# Patient Record
Sex: FEMALE | Race: WHITE | NOT HISPANIC OR LATINO | Employment: FULL TIME
[De-identification: names, ages, dates, MRNs, and addresses within clinical notes are randomized per-mention and may not be internally consistent; named-entity substitution may affect disease eponyms.]

---

## 2023-05-21 ENCOUNTER — HEALTH MAINTENANCE LETTER (OUTPATIENT)
Age: 38
End: 2023-05-21

## 2023-05-30 ENCOUNTER — OFFICE VISIT (OUTPATIENT)
Dept: FAMILY MEDICINE | Facility: CLINIC | Age: 38
End: 2023-05-30
Payer: COMMERCIAL

## 2023-05-30 VITALS
WEIGHT: 270 LBS | TEMPERATURE: 98.3 F | HEART RATE: 85 BPM | BODY MASS INDEX: 43.39 KG/M2 | SYSTOLIC BLOOD PRESSURE: 116 MMHG | OXYGEN SATURATION: 97 % | RESPIRATION RATE: 18 BRPM | HEIGHT: 66 IN | DIASTOLIC BLOOD PRESSURE: 76 MMHG

## 2023-05-30 DIAGNOSIS — Z00.00 ROUTINE GENERAL MEDICAL EXAMINATION AT A HEALTH CARE FACILITY: Primary | ICD-10-CM

## 2023-05-30 DIAGNOSIS — Z11.4 SCREENING FOR HIV (HUMAN IMMUNODEFICIENCY VIRUS): ICD-10-CM

## 2023-05-30 DIAGNOSIS — Z11.59 NEED FOR HEPATITIS C SCREENING TEST: ICD-10-CM

## 2023-05-30 DIAGNOSIS — F32.A DEPRESSION, UNSPECIFIED DEPRESSION TYPE: ICD-10-CM

## 2023-05-30 DIAGNOSIS — R73.09 ELEVATED GLUCOSE: ICD-10-CM

## 2023-05-30 DIAGNOSIS — L70.9 ACNE, UNSPECIFIED ACNE TYPE: ICD-10-CM

## 2023-05-30 LAB — HBA1C MFR BLD: 5.4 % (ref 0–5.6)

## 2023-05-30 PROCEDURE — 80048 BASIC METABOLIC PNL TOTAL CA: CPT

## 2023-05-30 PROCEDURE — 87389 HIV-1 AG W/HIV-1&-2 AB AG IA: CPT

## 2023-05-30 PROCEDURE — 36415 COLL VENOUS BLD VENIPUNCTURE: CPT

## 2023-05-30 PROCEDURE — 99214 OFFICE O/P EST MOD 30 MIN: CPT | Mod: 25

## 2023-05-30 PROCEDURE — 86803 HEPATITIS C AB TEST: CPT

## 2023-05-30 PROCEDURE — 83036 HEMOGLOBIN GLYCOSYLATED A1C: CPT

## 2023-05-30 PROCEDURE — 99385 PREV VISIT NEW AGE 18-39: CPT

## 2023-05-30 RX ORDER — SPIRONOLACTONE 50 MG/1
1 TABLET, FILM COATED ORAL
COMMUNITY
Start: 2023-03-14 | End: 2023-05-30

## 2023-05-30 RX ORDER — ESCITALOPRAM OXALATE 20 MG/1
20 TABLET ORAL
Qty: 90 TABLET | Refills: 3 | Status: SHIPPED | OUTPATIENT
Start: 2023-05-30 | End: 2023-12-31

## 2023-05-30 RX ORDER — SPIRONOLACTONE 50 MG/1
50 TABLET, FILM COATED ORAL
Qty: 270 TABLET | Refills: 3 | Status: SHIPPED | OUTPATIENT
Start: 2023-05-30 | End: 2023-12-31

## 2023-05-30 RX ORDER — ALBUTEROL SULFATE 0.83 MG/ML
2.5 SOLUTION RESPIRATORY (INHALATION) EVERY 6 HOURS PRN
COMMUNITY

## 2023-05-30 RX ORDER — ETONOGESTREL AND ETHINYL ESTRADIOL .12; .015 MG/D; MG/D
RING VAGINAL
COMMUNITY
Start: 2023-05-26 | End: 2024-02-06

## 2023-05-30 RX ORDER — ESCITALOPRAM OXALATE 20 MG/1
1 TABLET ORAL
COMMUNITY
Start: 2023-02-22 | End: 2023-05-30

## 2023-05-30 NOTE — PROGRESS NOTES
bmp   SUBJECTIVE:   CC: [place] is an 37 year old who presents for preventive health visit.       5/30/2023     1:52 PM   Additional Questions   Roomed by Brigitte Cooper CMA   Patient has been advised of split billing requirements and indicates understanding: Yes     History of Present Illness       Reason for visit:  Physical, med refill    She eats 0-1 servings of fruits and vegetables daily.She consumes 0 sweetened beverage(s) daily.She exercises with enough effort to increase her heart rate 20 to 29 minutes per day.  She exercises with enough effort to increase her heart rate 3 or less days per week.   She is taking medications regularly.    Recently moved back from [place] 6 months ago to be closer to family. No acute concerns today. Last pap smear was ~ 3 years ago and normal.    Today's PHQ-2 Score:       5/29/2023     9:31 PM   PHQ-2 ( 1999 [employer])   Q1: Little interest or pleasure in doing things 0   Q2: Feeling down, depressed or hopeless 0   PHQ-2 Score 0   Q1: Little interest or pleasure in doing things Not at all   Q2: Feeling down, depressed or hopeless Not at all   PHQ-2 Score 0     Have you ever done Advance Care Planning? (For example, a Health Directive, POLST, or a discussion with a medical provider or your loved ones about your wishes): No, advance care planning information given to patient to review.  Patient declined advance care planning discussion at this time.    Social History     Tobacco Use     Smoking status: Never     Smokeless tobacco: Never   Vaping Use     Vaping status: Never Used   Substance Use Topics     Alcohol use: Yes            View : No data to display.              Reviewed orders with patient.  Reviewed health maintenance and updated orders accordingly - Yes  Labs reviewed in EPIC  BP Readings from Last 3 Encounters:   05/30/23 116/76    Wt Readings from Last 3 Encounters:   05/30/23 122.5 kg (270 lb)         There is no problem list on file for this patient.    No past  surgical history on file.    Social History     Tobacco Use     Smoking status: Never     Smokeless tobacco: Never   Vaping Use     Vaping status: Never Used   Substance Use Topics     Alcohol use: Yes     Family History   Problem Relation Age of Onset     Other Cancer Mother         Hodgkin's lymphoma     Diabetes Father          No current outpatient medications on file.     No Known Allergies    Breast Cancer Screening:    FHS-7:        View : No data to display.              Patient under 40 years of age: Routine Mammogram Screening not recommended.   Pertinent mammograms are reviewed under the imaging tab.    History of abnormal Pap smear: NO - age 30-65 PAP every 5 years with negative HPV co-testing recommended       Reviewed and updated as needed this visit by clinical staff   Tobacco  Allergies  Meds  Problems  Med Hx  Surg Hx  Fam Hx          Reviewed and updated as needed this visit by Provider   Tobacco  Allergies  Meds  Problems  Med Hx  Surg Hx  Fam Hx           Past Medical History:   Diagnosis Date     Depressive disorder 2012     Uncomplicated asthma 2013      Past Surgical History:   Procedure Laterality Date     TONSILLECTOMY  1989       Review of Systems  CONSTITUTIONAL: NEGATIVE for fever, chills, change in weight  INTEGUMENTARU/SKIN: NEGATIVE for worrisome rashes, moles or lesions  EYES: NEGATIVE for vision changes or irritation  ENT: NEGATIVE for ear, mouth and throat problems  RESP: NEGATIVE for significant cough or SOB  BREAST: NEGATIVE for masses, tenderness or discharge  CV: NEGATIVE for chest pain, palpitations or peripheral edema  GI: NEGATIVE for nausea, abdominal pain, heartburn, or change in bowel habits  : NEGATIVE for unusual urinary or vaginal symptoms. Periods are regular.  MUSCULOSKELETAL: NEGATIVE for significant arthralgias or myalgia  NEURO: NEGATIVE for weakness, dizziness or paresthesias  PSYCHIATRIC: NEGATIVE for changes in mood or affect     OBJECTIVE:   BP  "116/76 (BP Location: Right arm, Patient Position: Sitting, Cuff Size: Adult Large)   Pulse 85   Temp 98.3  F (36.8  C) (Oral)   Resp 18   Ht 1.676 m (5' 6\")   Wt 122.5 kg (270 lb)   LMP 04/25/2023 (Approximate)   SpO2 97%   BMI 43.58 kg/m    Physical Exam  GENERAL: healthy, alert and no distress  EYES: Eyes grossly normal to inspection, PERRL and conjunctivae and sclerae normal  HENT: ear canals and TM's normal, nose and mouth without ulcers or lesions  NECK: no adenopathy, no asymmetry, masses, or scars and thyroid normal to palpation  RESP: lungs clear to auscultation - no rales, rhonchi or wheezes  CV: regular rate and rhythm, normal S1 S2, no S3 or S4, no murmur, click or rub  ABDOMEN: soft, nontender, no masses and bowel sounds normal  MS: no gross musculoskeletal defects noted, no edema  SKIN: no suspicious lesions or rashes  NEURO: Normal strength and tone, mentation intact and speech normal  PSYCH: mentation appears normal, affect normal/bright    Diagnostic Test Results:  none     ASSESSMENT/PLAN:   (Z00.00) Routine general medical examination at a health care facility  (primary encounter diagnosis)  Stable exam. Routine screening labs. Follow up in 1 year for annual wellness; sooner as needed for acute concerns.   Plan: REVIEW OF HEALTH MAINTENANCE PROTOCOL ORDERS,         Hemoglobin A1c, PRIMARY CARE FOLLOW-UP         SCHEDULING    (F32.A) Depression, unspecified depression type  Stable. No acute concerns or new side effects of medication. Refilled for 1 year.   Plan: escitalopram (LEXAPRO) 20 MG tablet          (R73.09) Elevated glucose  History of elevated glucose in the past. Told she has pre-diabetes. Will check A1C today.  Plan: Hemoglobin A1c          (L70.9) Acne, unspecified acne type  Stable on spironolactone. No acute concerns or new side effects of the medication. Will check BMP. Refilled for 1 year.  Plan: spironolactone (ALDACTONE) 50 MG tablet  BMP          (Z11.4) Screening for HIV " "(human immunodeficiency virus)  Plan: HIV Antigen Antibody Combo          (Z11.59) Need for hepatitis C screening test  Plan: Hepatitis C Screen Reflex to HCV RNA Quant and         Genotype         Patient has been advised of split billing requirements and indicates understanding: Yes    COUNSELING:  Reviewed preventive health counseling, as reflected in patient instructions    BMI:   Estimated body mass index is 43.58 kg/m  as calculated from the following:    Height as of this encounter: 1.676 m (5' 6\").    Weight as of this encounter: 122.5 kg (270 lb).   Weight management plan: Discussed healthy diet and exercise guidelines    She reports that she has never smoked. She has never used smokeless tobacco.        Saundra Mayo PA-C  [facility]  "

## 2023-05-30 NOTE — PROGRESS NOTES
"  {PROVIDER CHARTING PREFERENCE:932100}    Pooja Grove is a 37 year old, presenting for the following health issues:  Recheck Medication and Establish Care        5/30/2023     1:52 PM   Additional Questions   Roomed by Brigitte Cooper CMA     History of Present Illness       Reason for visit:  Physical, med refill    She eats 0-1 servings of fruits and vegetables daily.She consumes 0 sweetened beverage(s) daily.She exercises with enough effort to increase her heart rate 20 to 29 minutes per day.  She exercises with enough effort to increase her heart rate 3 or less days per week.   She is taking medications regularly.       {SUPERLIST (Optional):592166}  {additonal problems for provider to add (Optional):336120}      Review of Systems   {ROS COMP (Optional):106248}      Objective    /76 (BP Location: Right arm, Patient Position: Sitting, Cuff Size: Adult Large)   Pulse 85   Temp 98.3  F (36.8  C) (Oral)   Resp 18   Ht 1.676 m (5' 6\")   Wt 122.5 kg (270 lb)   LMP 04/25/2023 (Approximate)   SpO2 97%   BMI 43.58 kg/m    Body mass index is 43.58 kg/m .  Physical Exam   {Exam List (Optional):719207}    {Diagnostic Test Results (Optional):740640}    {AMBULATORY ATTESTATION (Optional):019955}            "

## 2023-05-31 LAB
ANION GAP SERPL CALCULATED.3IONS-SCNC: 14 MMOL/L (ref 7–15)
BUN SERPL-MCNC: 13.1 MG/DL (ref 6–20)
CALCIUM SERPL-MCNC: 9 MG/DL (ref 8.6–10)
CHLORIDE SERPL-SCNC: 101 MMOL/L (ref 98–107)
CREAT SERPL-MCNC: 0.94 MG/DL (ref 0.51–0.95)
DEPRECATED HCO3 PLAS-SCNC: 22 MMOL/L (ref 22–29)
GFR SERPL CREATININE-BSD FRML MDRD: 79 ML/MIN/1.73M2
GLUCOSE SERPL-MCNC: 121 MG/DL (ref 70–99)
HCV AB SERPL QL IA: NONREACTIVE
HIV 1+2 AB+HIV1 P24 AG SERPL QL IA: NONREACTIVE
POTASSIUM SERPL-SCNC: 4.6 MMOL/L (ref 3.4–5.3)
SODIUM SERPL-SCNC: 137 MMOL/L (ref 136–145)

## 2023-06-05 ENCOUNTER — TELEPHONE (OUTPATIENT)
Dept: FAMILY MEDICINE | Facility: CLINIC | Age: 38
End: 2023-06-05

## 2023-06-05 NOTE — LETTER
"June 7, 2023      M[place] Choe  [address] [address] [address]        Dear Ms.Дмитрий,    We have been unable to reach you by phone. We are writing to inform you of your test results.    Per Saundra Mayo PA-C:  \"Please call the patient and let her know that her glucose level was elevated but give she was not fasting I am not concerned. Additionally her A1C was 5.4% which is normal. Her kidney function and electrolytes were also normal.\"    If you have any questions or concerns, please call the clinic at the number listed above.       Sincerely,      Gayle Ramirez RN               "

## 2023-06-05 NOTE — TELEPHONE ENCOUNTER
Message #1 left for patient to return call to triage nurse      Saundra Mayo PA-C   6/5/2023 11:49 AM CDT       Please call the patient and let her know that her glucose level was elevated but give she was not fasting I am not concerned. Additionally her A1C was 5.4% which is normal. Her kidney function and electrolytes were also normal.      Thanks!  MARIA ISABEL Marshall, Registered Nurse  [facility]

## 2023-09-26 ENCOUNTER — TELEPHONE (OUTPATIENT)
Dept: FAMILY MEDICINE | Facility: CLINIC | Age: 38
End: 2023-09-26

## 2023-09-26 NOTE — TELEPHONE ENCOUNTER
Patient Quality Outreach    Patient is due for the following:   Cervical Cancer Screening - PAP Needed      Topic Date Due    Flu Vaccine (1) 09/01/2023       Next Steps:   Schedule a office visit for PAP only    Type of outreach:    Sent [facility] message.    Next Steps:  Reach out within 90 days via Letter.    Max number of attempts reached: Yes. Will try again in 90 days if patient still on fail list.    Questions for provider review:    None           Brigitte Cooper CMA      Patient Quality Outreach    Patient is due for the following:   Cervical Cancer Screening - PAP Needed      Topic Date Due    Flu Vaccine (1) 09/01/2023    COVID-19 Vaccine (2 - 2023-24 season) 09/01/2023       Next Steps:   Schedule a office visit for Pap Only    Type of outreach:    Sent letter.      Questions for provider review:    None           Brigitte Cooper CMA

## 2023-09-26 NOTE — LETTER
November 3, 2023      M[place] Choe  [address]  [place] [address]        Dear [place],       Our records indicate that you have not scheduled for a(an) appointment with your provider for a Cervical Cancer Screening - PAP Needed which was recommended by your health care provider.     If you have received your health care elsewhere, please call the clinic so the information can be documented in your chart and name can be removed from the reminder list.     Please call [phone] to schedule your appointment with your primary care provider     Thank you for your understanding.     Sincerely,        [facility]

## 2023-10-24 ENCOUNTER — NURSE TRIAGE (OUTPATIENT)
Dept: FAMILY MEDICINE | Facility: CLINIC | Age: 38
End: 2023-10-24
Payer: COMMERCIAL

## 2023-10-24 NOTE — TELEPHONE ENCOUNTER
"    Nurse Triage SBAR    Is this a 2nd Level Triage? YES, LICENSED PRACTITIONER REVIEW IS REQUIRED    Situation: shortness of breath    Background: Pt has history of asthma and allergies. Pt's current symptoms started two weeks ago.    Assessment: Shortness of breath starting two weeks ago. Pt states \"It is hard to take a deep breath\". Pt has been taking her albuterol inhaler daily. She feels pressure in her chest (pt states \"it feels like a heaviness\"). Productive cough started two weeks ago which is bringing up clear phlegm.        Denies runny nose, fever      Protocol Recommended Disposition:   Go To Office Now    Recommendation: Reviewed care advice under care tab with pt. Instructed pt to call back if new or worsening symptoms.Patient was given an opportunity to ask questions, verbalized understanding of plan, and is agreeable.    No appointments in clinic today.  Pt states \"I'm going to urgent care after work today then\"     Radha DIAZ RN      Routed to provider    Does the patient meet one of the following criteria for ADS visit consideration? 16+ years old, with an [facility] PCP     TIP  Providers, please consider if this condition is appropriate for management at one of our Acute and Diagnostic Services sites.     If patient is a good candidate, please use dotphrase <dot>triageresponse and select Refer to ADS to document.    Reason for Disposition   MILD difficulty breathing (e.g., minimal/no SOB at rest, SOB with walking, pulse < 100) of new-onset or worse than normal    Additional Information   Negative: SEVERE difficulty breathing (e.g., struggling for each breath, speaks in single words, pulse > 120)   Negative: Breathing stopped and hasn't returned   Negative: Choking on something   Negative: Bluish (or gray) lips or face   Negative: Difficult to awaken or acting confused (e.g., disoriented, slurred speech)   Negative: Passed out (i.e., fainted, collapsed and was not responding)   Negative: Wheezing " "started suddenly after medicine, an allergic food, or bee sting   Negative: Stridor (harsh sound while breathing in)   Negative: Slow, shallow and weak breathing   Negative: Sounds like a life-threatening emergency to the triager   Negative: Chest pain   Negative: Wheezing (high pitched whistling sound) and previous asthma attacks or use of asthma medicines   Negative: Difficulty breathing and within 14 days of COVID-19 Exposure   Negative: Difficulty breathing and only present when coughing   Negative: Difficulty breathing and only from stuffy nose   Negative: Difficulty breathing and only from stuffy nose or runny nose from common cold   Negative: MODERATE difficulty breathing (e.g., speaks in phrases, SOB even at rest, pulse 100-120) of new-onset or worse than normal   Negative: Oxygen level (e.g., pulse oximetry) 90% or lower   Negative: Wheezing can be heard across the room   Negative: Drooling or spitting out saliva (because can't swallow)   Negative: Any history of prior \"blood clot\" in leg or lungs   Negative: Illness requiring prolonged bedrest in past month (e.g., immobilization, long hospital stay)   Negative: Hip or leg fracture (broken bone) in past month (or had cast on leg or ankle in past month)   Negative: Major surgery in the past month   Negative: Long-distance travel in past month (e.g., car, bus, train, plane; with trip lasting 6 or more hours)   Negative: Cancer treatment in past six months (or has cancer now)   Negative: Extra heartbeats, irregular heart beating, or heart is beating very fast (i.e., 'palpitations')   Negative: Fever > 103 F (39.4 C)   Negative: Fever > 101 F (38.3 C) and over 60 years of age   Negative: Fever > 100.0 F (37.8 C) and bedridden (e.g., nursing home patient, stroke, chronic illness, recovering from surgery)   Negative: Fever > 100.0 F (37.8 C) and diabetes mellitus or weak immune system (e.g., HIV positive, cancer chemo, splenectomy, organ transplant, chronic " "steroids)   Negative: Periods where breathing stops and then resumes normally and bedridden (e.g., nursing home patient, CVA)   Negative: Pregnant or postpartum (from 0 to 6 weeks after delivery)   Negative: Patient sounds very sick or weak to the triager    Answer Assessment - Initial Assessment Questions  1. RESPIRATORY STATUS: \"Describe your breathing?\" (e.g., wheezing, shortness of breath, unable to speak, severe coughing)       Hard to take a deep breath  2. ONSET: \"When did this breathing problem begin?\"       A couple weeks ago  3. PATTERN \"Does the difficult breathing come and go, or has it been constant since it started?\"       constant  4. SEVERITY: \"How bad is your breathing?\" (e.g., mild, moderate, severe)     - MILD: No SOB at rest, mild SOB with walking, speaks normally in sentences, can lie down, no retractions, pulse < 100.     - MODERATE: SOB at rest, SOB with minimal exertion and prefers to sit, cannot lie down flat, speaks in phrases, mild retractions, audible wheezing, pulse 100-120.     - SEVERE: Very SOB at rest, speaks in single words, struggling to breathe, sitting hunched forward, retractions, pulse > 120      Mild- moderate  5. RECURRENT SYMPTOM: \"Have you had difficulty breathing before?\" If Yes, ask: \"When was the last time?\" and \"What happened that time?\"       Yes, yearly and yearly injections  6. CARDIAC HISTORY: \"Do you have any history of heart disease?\" (e.g., heart attack, angina, bypass surgery, angioplasty)       no  7. LUNG HISTORY: \"Do you have any history of lung disease?\"  (e.g., pulmonary embolus, asthma, emphysema)      Asthma, taking albuterol inhaler daily  8. CAUSE: \"What do you think is causing the breathing problem?\"       unsure  9. OTHER SYMPTOMS: \"Do you have any other symptoms? (e.g., dizziness, runny nose, cough, chest pain, fever)  Cough- started two weeks ago, productive bringing up clear phlegm  Chest pressure (heaviness in chest)      Denies runny nose, " "fever  10. O2 SATURATION MONITOR:  \"Do you use an oxygen saturation monitor (pulse oximeter) at home?\" If Yes, ask: \"What is your reading (oxygen level) today?\" \"What is your usual oxygen saturation reading?\" (e.g., 95%)        no  11. PREGNANCY: \"Is there any chance you are pregnant?\" \"When was your last menstrual period?\"        No, LMP-just had it 3 weeks ago  12. TRAVEL: \"Have you traveled out of the country in the last month?\" (e.g., travel history, exposures)        no    Protocols used: Breathing Difficulty-A-[place]    "

## 2023-11-07 ENCOUNTER — TELEPHONE (OUTPATIENT)
Dept: FAMILY MEDICINE | Facility: CLINIC | Age: 38
End: 2023-11-07
Payer: COMMERCIAL

## 2023-11-07 NOTE — TELEPHONE ENCOUNTER
RN spoke to patient     She was seen at [facility], states they did not do lab or xray and told her she was fine     Continues with cough. Was having a hard time taking deep breaths this has gotten a bit better   Explained that needs to be seen in person - only 1 opening at  clinic this week that will not work for patient     Patient will call scheduling to see if any openings at other close clinics, if none available advised to go to [facility] urgent care so we can see the records when she follows up     Patient states understanding     Iza Epstein, Registered Nurse  [facility]

## 2023-11-08 ENCOUNTER — ANCILLARY PROCEDURE (OUTPATIENT)
Dept: GENERAL RADIOLOGY | Facility: CLINIC | Age: 38
End: 2023-11-08
Attending: PHYSICIAN ASSISTANT
Payer: COMMERCIAL

## 2023-11-08 ENCOUNTER — OFFICE VISIT (OUTPATIENT)
Dept: URGENT CARE | Facility: URGENT CARE | Age: 38
End: 2023-11-08
Payer: COMMERCIAL

## 2023-11-08 VITALS
DIASTOLIC BLOOD PRESSURE: 81 MMHG | TEMPERATURE: 98.2 F | HEART RATE: 75 BPM | SYSTOLIC BLOOD PRESSURE: 122 MMHG | OXYGEN SATURATION: 99 %

## 2023-11-08 DIAGNOSIS — R05.3 PERSISTENT COUGH FOR 3 WEEKS OR LONGER: ICD-10-CM

## 2023-11-08 DIAGNOSIS — J22 LOWER RESPIRATORY TRACT INFECTION: Primary | ICD-10-CM

## 2023-11-08 PROCEDURE — 99213 OFFICE O/P EST LOW 20 MIN: CPT | Performed by: PHYSICIAN ASSISTANT

## 2023-11-08 PROCEDURE — 71046 X-RAY EXAM CHEST 2 VIEWS: CPT | Mod: TC | Performed by: RADIOLOGY

## 2023-11-08 RX ORDER — AZITHROMYCIN 250 MG/1
TABLET, FILM COATED ORAL
Qty: 6 TABLET | Refills: 0 | Status: SHIPPED | OUTPATIENT
Start: 2023-11-08 | End: 2023-11-13

## 2023-11-08 RX ORDER — PREDNISONE 20 MG/1
40 TABLET ORAL DAILY
Qty: 10 TABLET | Refills: 0 | Status: SHIPPED | OUTPATIENT
Start: 2023-11-08 | End: 2023-11-13

## 2023-11-08 NOTE — PROGRESS NOTES
Assessment & Plan     Lower respiratory tract infection  Ongoing symptoms for the past 3 weeks.  Symptoms are worsening.  On exam patient is in no acute respiratory distress.  Nontoxic-appearing.  Vitals are stable.  Chest x-ray is negative for acute infiltrates.  We have elected to treat for presumed bacterial bronchitis today.  Zithromax is prescribed.  Prednisone also prescribed to help with bronchial inflammation.  Continue albuterol inhaler as needed.  Follow-up if any worsening symptoms.  Patient agrees with the plan.  - azithromycin (ZITHROMAX) 250 MG tablet  Dispense: 6 tablet; Refill: 0  - predniSONE (DELTASONE) 20 MG tablet  Dispense: 10 tablet; Refill: 0    Persistent cough for 3 weeks or longer  Please see above recommendations.  - XR Chest 2 Views      Return in about 10 days (around 11/18/2023) for Symptoms failing to improve.    Pura Haywood PA-C  [facility] [place] is a 38 year old female who presents to clinic today for the following health issues:  Chief Complaint   Patient presents with    Urgent Care     Persistent cough for 3 weeks now.      HPI      URI Adult    Onset of symptoms was 3 week(s) ago.  Course of illness is worsening.    Severity moderate  Current and Associated symptoms: cough - productive, SOB, chest tightness  Denies fever  Treatment measures tried include Inhaler (name: albuterol), mucinex  Predisposing factors include seasonal allergies and HX of asthma.      Review of Systems  Constitutional, HEENT, cardiovascular, pulmonary, GI, , musculoskeletal, neuro, skin, endocrine and psych systems are negative, except as otherwise noted.      Objective    /81 (BP Location: Right arm, Patient Position: Sitting, Cuff Size: Adult Large)   Pulse 75   Temp 98.2  F (36.8  C) (Tympanic)   SpO2 99%   Physical Exam   GENERAL: healthy, alert and no distress  HENT: ear canals and TM's normal, mouth without ulcers or lesions  RESP:  lungs clear to auscultation - no rales, rhonchi or wheezes, decreased air movement lower lung fields  CV: regular rate and rhythm, normal S1 S2  MS: no gross musculoskeletal defects noted, no edema    CXR - Reviewed and interpreted by me : Increased perihilar markings bilaterally, no acute infiltrates or effusions

## 2023-11-29 ENCOUNTER — OFFICE VISIT (OUTPATIENT)
Dept: FAMILY MEDICINE | Facility: CLINIC | Age: 38
End: 2023-11-29
Payer: COMMERCIAL

## 2023-11-29 VITALS
HEIGHT: 66 IN | OXYGEN SATURATION: 96 % | HEART RATE: 87 BPM | SYSTOLIC BLOOD PRESSURE: 118 MMHG | RESPIRATION RATE: 17 BRPM | WEIGHT: 272.4 LBS | DIASTOLIC BLOOD PRESSURE: 77 MMHG | BODY MASS INDEX: 43.78 KG/M2 | TEMPERATURE: 98.3 F

## 2023-11-29 DIAGNOSIS — R05.2 SUBACUTE COUGH: Primary | ICD-10-CM

## 2023-11-29 PROCEDURE — 99213 OFFICE O/P EST LOW 20 MIN: CPT

## 2023-11-29 RX ORDER — LEVOCETIRIZINE DIHYDROCHLORIDE 2.5 MG/5ML
5 SOLUTION ORAL DAILY
Qty: 148 ML | Refills: 0 | Status: SHIPPED | OUTPATIENT
Start: 2023-11-29 | End: 2024-07-11

## 2023-11-29 NOTE — PATIENT INSTRUCTIONS
Taper off prednisone, 20 mg for 3 days, 10 mg for 3 days etc.     Xyzal daily    OTC medications/remedies  1. Mucinex (Guaifennesin)     2.  Robitussin    3.  Delsym    4.  Salt water Gargles for sore throat    5.  Tylenol or Ibuprofen    6.  Warm tea with Honey

## 2023-11-29 NOTE — PROGRESS NOTES
"  Assessment & Plan     (R05.2) Subacute cough  (primary encounter diagnosis)  Comment: patient currently has been on prednisone for 2 weeks now. Would like patient to taper off of this. I suspect patient may have an allergy component that may not be addressed. We discussed that if patient start developing respiratory issues after tapering off of prednisone to follow up and may have to be worked up for a restrictive lung process. Levoceterizine ordered, Discussed medication risks and benefits of Levoceterizine with patient in detail with patient verbal understanding. If patient not wanting to proceed w/ this could trial a different OTC antihistamine. Patient fully understands and is agreeable with plan of care, at this point patient will follow up as needed unless acute concerns arise in the meantime.  Plan: levocetirizine (XYZAL) 2.5 MG/5ML solution       MED REC REQUIRED  Post Medication Reconciliation Status: discharge medications reconciled, continue medications without change  BMI:   Estimated body mass index is 43.97 kg/m  as calculated from the following:    Height as of this encounter: 1.676 m (5' 6\").    Weight as of this encounter: 123.6 kg (272 lb 6.4 oz).     CRISTAL Leon CNP  [facility]    Pooja Grove is a 38 year old, presenting for the following health issues:  No chief complaint on file.        11/29/2023     2:46 PM   Additional Questions   Roomed by Jessica Gonzalez       History of Present Illness       Reason for visit:  Follow up on coughing/bronchitis.    She eats 0-1 servings of fruits and vegetables daily.She consumes 0 sweetened beverage(s) daily.She exercises with enough effort to increase her heart rate 30 to 60 minutes per day.  She exercises with enough effort to increase her heart rate 4 days per week.   She is taking medications regularly.     ED/UC Followup:    Facility:  [facility]  Date of visit: 11/8/23  Reason for " "visit: Lower respiratory tract infection  Current Status: cough is a little better but still there    -Patient seen in  earlier  -Feels better   -However, patient still having nagging cough   -patient currently taking prednisone 40 mg daily, has been doing this for 2 weeks now.   -Has inhaler, uses once a day-feels this helps   -Patient has had allergy shots in the past and has helped tremendously    Review of Systems   Constitutional, HEENT, cardiovascular, pulmonary, gi and gu systems are negative, except as otherwise noted.      Objective    /77 (BP Location: Right arm, Patient Position: Sitting, Cuff Size: Adult Large)   Pulse 87   Temp 98.3  F (36.8  C) (Oral)   Resp 17   Ht 1.676 m (5' 6\")   Wt 123.6 kg (272 lb 6.4 oz)   LMP 10/29/2023 (Approximate)   SpO2 96%   BMI 43.97 kg/m    Body mass index is 43.97 kg/m .  Physical Exam  Vitals and nursing note reviewed.   Constitutional:       General: She is not in acute distress.     Appearance: Normal appearance. She is not ill-appearing.   HENT:      Right Ear: Tympanic membrane, ear canal and external ear normal. There is no impacted cerumen.      Left Ear: Tympanic membrane, ear canal and external ear normal. There is no impacted cerumen.      Nose: No congestion or rhinorrhea.      Mouth/Throat:      Mouth: Mucous membranes are moist.      Pharynx: No oropharyngeal exudate or posterior oropharyngeal erythema.   Eyes:      General:         Right eye: No discharge.         Left eye: No discharge.      Conjunctiva/sclera: Conjunctivae normal.      Pupils: Pupils are equal, round, and reactive to light.   Cardiovascular:      Rate and Rhythm: Normal rate and regular rhythm.      Heart sounds: No murmur heard.     No friction rub. No gallop.   Pulmonary:      Effort: No respiratory distress.      Breath sounds: Normal breath sounds. No stridor. No wheezing, rhonchi or rales.   Musculoskeletal:      Cervical back: No tenderness.   Lymphadenopathy:      " Cervical: No cervical adenopathy.   Skin:     General: Skin is warm and dry.   Neurological:      Mental Status: She is alert.   Psychiatric:         Mood and Affect: Mood normal.         Behavior: Behavior normal.         Thought Content: Thought content normal.         Judgment: Judgment normal.

## 2023-12-12 ENCOUNTER — ANCILLARY PROCEDURE (OUTPATIENT)
Dept: GENERAL RADIOLOGY | Facility: CLINIC | Age: 38
End: 2023-12-12
Attending: PHYSICIAN ASSISTANT
Payer: COMMERCIAL

## 2023-12-12 ENCOUNTER — OFFICE VISIT (OUTPATIENT)
Dept: URGENT CARE | Facility: URGENT CARE | Age: 38
End: 2023-12-12
Payer: COMMERCIAL

## 2023-12-12 VITALS
SYSTOLIC BLOOD PRESSURE: 122 MMHG | RESPIRATION RATE: 18 BRPM | TEMPERATURE: 97.5 F | OXYGEN SATURATION: 96 % | HEART RATE: 84 BPM | DIASTOLIC BLOOD PRESSURE: 80 MMHG

## 2023-12-12 DIAGNOSIS — M25.561 ACUTE PAIN OF RIGHT KNEE: ICD-10-CM

## 2023-12-12 DIAGNOSIS — M25.561 ACUTE PAIN OF RIGHT KNEE: Primary | ICD-10-CM

## 2023-12-12 PROCEDURE — 73562 X-RAY EXAM OF KNEE 3: CPT | Mod: TC | Performed by: RADIOLOGY

## 2023-12-12 PROCEDURE — 99214 OFFICE O/P EST MOD 30 MIN: CPT | Performed by: PHYSICIAN ASSISTANT

## 2023-12-13 NOTE — PROGRESS NOTES
Assessment/Plan:    Xray R knee- no significant degenerative changes or fracture per my read.   No erythema so do not suspect infection or gout. NVI.  Differential diagnosis include sprain, tendonitis, internal derangement. Advised RICE, NSAIDs PRN. Follow up with ortho if not improving.     See patient instructions below.    At the end of the encounter, I discussed results, diagnosis, medications. Discussed red flags for immediate return to clinic/ER, as well as indications for follow up if no improvement. Patient understood and agreed to plan. Patient was stable for discharge.      ICD-10-CM    1. Acute pain of right knee  M25.561 XR Knee Right 3 Views     Orthopedic  Referral     CANCELED: Knee Supplies Order Knee Sleeve/Brace; Right; Open            Return in about 2 weeks (around 12/26/2023) for follow up with ortho if not improving.    EL Angulo, MARIA ISABEL  [facility]  -----------------------------------------------------------------------------------------------------------------------------------------------------    HPI:  Maine Choe is a 38 year old female who presents for evaluation of R lateral knee pain & swelling onset 3 weeks ago. No injury. Pain occurs with weight bearing and particularly when going up & down the stairs. No treatments tried. Patient reports no fever/chills, erythema, numbness, or any other symptoms.     Past Medical History:   Diagnosis Date    Depressive disorder 2012    Uncomplicated asthma 2013       Vitals:    12/12/23 1754   BP: 122/80   BP Location: Right arm   Patient Position: Sitting   Cuff Size: Adult Regular   Pulse: 84   Resp: 18   Temp: 97.5  F (36.4  C)   TempSrc: Tympanic   SpO2: 96%       Physical Exam  Vitals and nursing note reviewed.   Pulmonary:      Effort: Pulmonary effort is normal.   Musculoskeletal:      Right knee: Swelling (generalized) present. Normal range of motion. No tenderness. Normal pulse.    Neurological:      Mental Status: She is alert.         Labs/Imaging:  No results found for this or any previous visit (from the past 24 hour(s)).  No results found for this or any previous visit (from the past 24 hour(s)).    Xray R knee- no significant degenerative changes or fracture per my read    Patient Instructions   1) Take Ibuprofen 600 mg 3-4 times daily with food OR naproxen 250 mg every 12 hours as needed for pain. If this is not sufficient for pain control it may be combined with Tylenol up to 1000 mg four times a day.   2) Ice to the affected area 20 minutes three times daily.  3) Elevate the affected injury above the level of the heart when you are able to.   4) Compress the affected area using an ACE wrap or brace. This will help to keep swelling down and helps with pain relief.   5) Follow up in 10-14 days if not improving, sooner if worsening.

## 2023-12-13 NOTE — PATIENT INSTRUCTIONS
1) Take Ibuprofen 600 mg 3-4 times daily with food OR naproxen 250 mg every 12 hours as needed for pain. If this is not sufficient for pain control it may be combined with Tylenol up to 1000 mg four times a day.   2) Ice to the affected area 20 minutes three times daily.  3) Elevate the affected injury above the level of the heart when you are able to.   4) Compress the affected area using an ACE wrap or brace. This will help to keep swelling down and helps with pain relief.   5) Follow up in 10-14 days if not improving, sooner if worsening.

## 2023-12-31 ENCOUNTER — MYC REFILL (OUTPATIENT)
Dept: FAMILY MEDICINE | Facility: CLINIC | Age: 38
End: 2023-12-31
Payer: COMMERCIAL

## 2023-12-31 DIAGNOSIS — L70.9 ACNE, UNSPECIFIED ACNE TYPE: ICD-10-CM

## 2023-12-31 DIAGNOSIS — F32.A DEPRESSION, UNSPECIFIED DEPRESSION TYPE: ICD-10-CM

## 2024-01-02 RX ORDER — SPIRONOLACTONE 50 MG/1
50 TABLET, FILM COATED ORAL
Qty: 270 TABLET | Refills: 0 | Status: SHIPPED | OUTPATIENT
Start: 2024-01-02 | End: 2024-03-28

## 2024-01-02 RX ORDER — ESCITALOPRAM OXALATE 20 MG/1
20 TABLET ORAL
Qty: 90 TABLET | Refills: 1 | Status: SHIPPED | OUTPATIENT
Start: 2024-01-02 | End: 2024-07-11

## 2024-01-02 NOTE — PROGRESS NOTES
"ASSESSMENT & PLAN       Today we discussed the underlying etiology/pathology of patient's   1. Jumpshade's knee of right side      -Discussed patient's x-rays today of her right knee which are largely unremarkable.  X-rays are a little bit subpar in technique.  You could say that she maybe has narrowing of the lateral compartment but this is not where her pain is.  - Patient is point tender over the infrapatellar tendon of the extensor mechanism.  No mechanical symptoms.  - Patient understood inflammation of the patellar tendon and treatment strategies.  - Patient will be referred to physical therapy for rehab and treatment program.  Scheduling will call the patient within the next 3 business days.  - Patient should utilize ice to the anterior knee for at least 10-15 minutes at a time with appropriate skin barrier to help decrease inflammation and pain.  - She should avoid stressful activities such as repetitive steps, kneeling, squats and lunging  - Patient will start diclofenac 50 mg tablet to be taken 1 tablet every 12 hours to decrease inflammation  - She may use topical agents as desired but we did discuss using/purchasing over-the-counter Voltaren 1% gel and may apply it to the anterior knee tissues every 8 hours.  Patient should avoid soaking or washing this off for at least 1 hour after application to allow tissue penetration.  - If patient is not improving as expected over the next 4-5 weeks I like to see her back for repeat assessment.  If patient is doing well patient does not need formal clinical follow-up appointment.  - We discussed the 1: 4 rule in regards to stress and strain on joints with increased carrying of weight.  - With ambulation of steps she should lead with her left leg going up and lead with her right leg going down \" up with the good, down with the bad\"    -Call direct clinic number [[phone]] at any time with questions or concerns in regards to your recent office visit with me. "     Karlos Dhillon PA-C  [facility]      SUBJECTIVE  Maine Choe is a/an 38 year old female who is seen in consultation at the request of  Saumya Coombs PA-C for evaluation of right knee pain. The patient is seen by themselves.    Onset: 6 week(s) ago. Reports insidious onset without acute precipitating event.  Location of Pain: right knee - anterior over the patella; mild radiation of pain down the leg after sitting long periods; no numbness/tingling  Rating of Pain at worst: 8/10  Rating of Pain Currently: 4/10  Worsened by: sitting on her toilet (knees above the hips), up and down stairs, wakes at night at times, laying on stomach, kneeling, squatting   Better with: mild with treatments, not getting worse or better  Treatments tried: rest/activity avoidance, ice, ibuprofen, and ACE bandage, THC topical ointment  Quality: often achy, dull; intermittent sharp, stabbing  Associated symptoms: swelling, weakness of the knee at times, and feeling of instability  Orthopedic history: NO  Relevant surgical history: NO  Social history: social history: works at mental health therapist; pickleball occasionally, gym classes (aerobic, yoga, stretching)    Past Medical History:   Diagnosis Date    Depressive disorder 2012    Uncomplicated asthma 2013     Social History     Socioeconomic History    Marital status:    Tobacco Use    Smoking status: Never    Smokeless tobacco: Never   Vaping Use    Vaping Use: Never used   Substance and Sexual Activity    Alcohol use: Yes    Drug use: Not Currently     Types: Marijuana    Sexual activity: Yes     Partners: Male     Birth control/protection: Inserts/Ring   Other Topics Concern    Parent/sibling w/ CABG, MI or angioplasty before 65F 55M? No     Social Determinants of Health     Financial Resource Strain: Low Risk  (11/29/2023)    Financial Resource Strain     Within the past 12 months, have you or your family members you live with been  unable to get utilities (heat, electricity) when it was really needed?: No   Food Insecurity: Low Risk  (11/29/2023)    Food Insecurity     Within the past 12 months, did you worry that your food would run out before you got money to buy more?: No     Within the past 12 months, did the food you bought just not last and you didn t have money to get more?: No   Transportation Needs: Low Risk  (11/29/2023)    Transportation Needs     Within the past 12 months, has lack of transportation kept you from medical appointments, getting your medicines, non-medical meetings or appointments, work, or from getting things that you need?: No   Interpersonal Safety: Low Risk  (11/29/2023)    Interpersonal Safety     Do you feel physically and emotionally safe where you currently live?: Yes     Within the past 12 months, have you been hit, slapped, kicked or otherwise physically hurt by someone?: No     Within the past 12 months, have you been humiliated or emotionally abused in other ways by your partner or ex-partner?: No   Housing Stability: Low Risk  (11/29/2023)    Housing Stability     Do you have housing? : Yes     Are you worried about losing your housing?: No         Patient's past medical, surgical, social, and family histories were personally reviewed today and no changes are noted.    REVIEW OF SYSTEMS:  10 point ROS is negative other than symptoms noted above in HPI, Past Medical History or as stated below  Constitutional: NEGATIVE for fever, chills, change in weight  Skin: NEGATIVE for worrisome rashes, moles or lesions  GI/: NEGATIVE for bowel or bladder changes  Neuro: NEGATIVE for weakness, dizziness or paresthesias    OBJECTIVE:  LMP 10/29/2023 (Approximate)    General: healthy, alert and in no distress  HEENT: no scleral icterus or conjunctival erythema  Skin: no suspicious lesions or rash. No jaundice.  CV: no pedal edema  Resp: normal respiratory effort without conversational dyspnea   Psych: normal mood and  affect  Gait: normal steady gait with appropriate coordination and balance  Neuro: Normal light sensory exam of lower extremity      MSK:  Exam shows a pleasant 38-year-old female habits weightbearing without assistive device.  Well-nourished.  Examination of the right knee shows no bruising, no swelling or ecchymosis.  Patient has full knee extension and flexion within normal limits without crepitation.  Patella tracks centrally.  No pain along the medial or lateral patellar facet.  Patellar grind test negative.  She is nontender over the distal quadriceps and body of the patella but is point tender over the infrapatellar tendon.  No real discomfort on the medial or lateral joint line.  Ligament exam is stable at 0 and 30 degrees.  Drawer testing is stable.  Circumduction maneuvers are negative for knee pain or mechanical phenomenon.  Patient has full active and passive range of motion of the right hip with negative impingement testing or pain.  Motor strength is maintained for quadricep and hamstring strength.  No lower extremity edema.  No pain over the Pez anserine bursa or the hamstrings to palpation.  No effusion or warmth.        Independent visualization of the below image:  Previous x-rays of the right knee are personally reviewed today.  I agree with radiology interpretation.  EXAM: XR KNEE RIGHT 3 VIEWS  LOCATION: [facility]  DATE: 12/12/2023     INDICATION: pain (laterally), swelling x 3 weeks. no injury  COMPARISON: None.                                                                    IMPRESSION: No fracture or effusion. Mild lateral compartment narrowing.    Patient's conditions were thoroughly discussed during today's visit with total time spent face-to-face with the patient and documentation being 30 minutes.    Karlos Dhillon PA-C  [facility]    This note was completed in part using a voice recognition software, any grammatical or context distortion are  unintentional and inherent to the software.

## 2024-01-04 ENCOUNTER — OFFICE VISIT (OUTPATIENT)
Dept: ORTHOPEDICS | Facility: CLINIC | Age: 39
End: 2024-01-04
Attending: PHYSICIAN ASSISTANT
Payer: COMMERCIAL

## 2024-01-04 VITALS
DIASTOLIC BLOOD PRESSURE: 80 MMHG | HEIGHT: 66 IN | WEIGHT: 279 LBS | SYSTOLIC BLOOD PRESSURE: 135 MMHG | BODY MASS INDEX: 44.84 KG/M2

## 2024-01-04 DIAGNOSIS — M76.51 JUMPER'S KNEE OF RIGHT SIDE: Primary | ICD-10-CM

## 2024-01-04 PROCEDURE — 99203 OFFICE O/P NEW LOW 30 MIN: CPT | Performed by: PHYSICIAN ASSISTANT

## 2024-01-04 NOTE — LETTER
1/4/2024         RE: Maine Choe  [address]  [place] [address]        Dear Colleague,    Thank you for referring your patient, Maine Choe, to the [facility] [place]. Please see a copy of my visit note below.    ASSESSMENT & PLAN       Today we discussed the underlying etiology/pathology of patient's   1. Jumper's knee of right side      -Discussed patient's x-rays today of her right knee which are largely unremarkable.  X-rays are a little bit subpar in technique.  You could say that she maybe has narrowing of the lateral compartment but this is not where her pain is.  - Patient is point tender over the infrapatellar tendon of the extensor mechanism.  No mechanical symptoms.  - Patient understood inflammation of the patellar tendon and treatment strategies.  - Patient will be referred to physical therapy for rehab and treatment program.  Scheduling will call the patient within the next 3 business days.  - Patient should utilize ice to the anterior knee for at least 10-15 minutes at a time with appropriate skin barrier to help decrease inflammation and pain.  - She should avoid stressful activities such as repetitive steps, kneeling, squats and lunging  - Patient will start diclofenac 50 mg tablet to be taken 1 tablet every 12 hours to decrease inflammation  - She may use topical agents as desired but we did discuss using/purchasing over-the-counter Voltaren 1% gel and may apply it to the anterior knee tissues every 8 hours.  Patient should avoid soaking or washing this off for at least 1 hour after application to allow tissue penetration.  - If patient is not improving as expected over the next 4-5 weeks I like to see her back for repeat assessment.  If patient is doing well patient does not need formal clinical follow-up appointment.  - We discussed the 1: 4 rule in regards to stress and strain on joints with increased carrying of weight.  - With ambulation of  "steps she should lead with her left leg going up and lead with her right leg going down \" up with the good, down with the bad\"    -Call direct clinic number [[phone]] at any time with questions or concerns in regards to your recent office visit with me.     Karlos Dhillon PA-C  [facility]      SUBJECTIVE  Maine Choe is a/an 38 year old female who is seen in consultation at the request of  Saumya Coombs PA-C for evaluation of right knee pain. The patient is seen by themselves.    Onset: 6 week(s) ago. Reports insidious onset without acute precipitating event.  Location of Pain: right knee - anterior over the patella; mild radiation of pain down the leg after sitting long periods; no numbness/tingling  Rating of Pain at worst: 8/10  Rating of Pain Currently: 4/10  Worsened by: sitting on her toilet (knees above the hips), up and down stairs, wakes at night at times, laying on stomach, kneeling, squatting   Better with: mild with treatments, not getting worse or better  Treatments tried: rest/activity avoidance, ice, ibuprofen, and ACE bandage, THC topical ointment  Quality: often achy, dull; intermittent sharp, stabbing  Associated symptoms: swelling, weakness of the knee at times, and feeling of instability  Orthopedic history: NO  Relevant surgical history: NO  Social history: social history: works at mental health therapist; pickleball occasionally, gym classes (aerobic, yoga, stretching)    Past Medical History:   Diagnosis Date     Depressive disorder 2012     Uncomplicated asthma 2013     Social History     Socioeconomic History     Marital status:    Tobacco Use     Smoking status: Never     Smokeless tobacco: Never   Vaping Use     Vaping Use: Never used   Substance and Sexual Activity     Alcohol use: Yes     Drug use: Not Currently     Types: Marijuana     Sexual activity: Yes     Partners: Male     Birth control/protection: Inserts/Ring   Other Topics " Concern     Parent/sibling w/ CABG, MI or angioplasty before 65F 55M? No     Social Determinants of Health     Financial Resource Strain: Low Risk  (11/29/2023)    Financial Resource Strain      Within the past 12 months, have you or your family members you live with been unable to get utilities (heat, electricity) when it was really needed?: No   Food Insecurity: Low Risk  (11/29/2023)    Food Insecurity      Within the past 12 months, did you worry that your food would run out before you got money to buy more?: No      Within the past 12 months, did the food you bought just not last and you didn t have money to get more?: No   Transportation Needs: Low Risk  (11/29/2023)    Transportation Needs      Within the past 12 months, has lack of transportation kept you from medical appointments, getting your medicines, non-medical meetings or appointments, work, or from getting things that you need?: No   Interpersonal Safety: Low Risk  (11/29/2023)    Interpersonal Safety      Do you feel physically and emotionally safe where you currently live?: Yes      Within the past 12 months, have you been hit, slapped, kicked or otherwise physically hurt by someone?: No      Within the past 12 months, have you been humiliated or emotionally abused in other ways by your partner or ex-partner?: No   Housing Stability: Low Risk  (11/29/2023)    Housing Stability      Do you have housing? : Yes      Are you worried about losing your housing?: No         Patient's past medical, surgical, social, and family histories were personally reviewed today and no changes are noted.    REVIEW OF SYSTEMS:  10 point ROS is negative other than symptoms noted above in HPI, Past Medical History or as stated below  Constitutional: NEGATIVE for fever, chills, change in weight  Skin: NEGATIVE for worrisome rashes, moles or lesions  GI/: NEGATIVE for bowel or bladder changes  Neuro: NEGATIVE for weakness, dizziness or paresthesias    OBJECTIVE:  LMP  10/29/2023 (Approximate)    General: healthy, alert and in no distress  HEENT: no scleral icterus or conjunctival erythema  Skin: no suspicious lesions or rash. No jaundice.  CV: no pedal edema  Resp: normal respiratory effort without conversational dyspnea   Psych: normal mood and affect  Gait: normal steady gait with appropriate coordination and balance  Neuro: Normal light sensory exam of lower extremity      MSK:  Exam shows a pleasant 38-year-old female habits weightbearing without assistive device.  Well-nourished.  Examination of the right knee shows no bruising, no swelling or ecchymosis.  Patient has full knee extension and flexion within normal limits without crepitation.  Patella tracks centrally.  No pain along the medial or lateral patellar facet.  Patellar grind test negative.  She is nontender over the distal quadriceps and body of the patella but is point tender over the infrapatellar tendon.  No real discomfort on the medial or lateral joint line.  Ligament exam is stable at 0 and 30 degrees.  Drawer testing is stable.  Circumduction maneuvers are negative for knee pain or mechanical phenomenon.  Patient has full active and passive range of motion of the right hip with negative impingement testing or pain.  Motor strength is maintained for quadricep and hamstring strength.  No lower extremity edema.  No pain over the Pez anserine bursa or the hamstrings to palpation.  No effusion or warmth.        Independent visualization of the below image:  Previous x-rays of the right knee are personally reviewed today.  I agree with radiology interpretation.  EXAM: XR KNEE RIGHT 3 VIEWS  LOCATION: [facility]  DATE: 12/12/2023     INDICATION: pain (laterally), swelling x 3 weeks. no injury  COMPARISON: None.                                                                    IMPRESSION: No fracture or effusion. Mild lateral compartment narrowing.    Patient's conditions were thoroughly  discussed during today's visit with total time spent face-to-face with the patient and documentation being 30 minutes.    Karlos Dhillon PA-C  [facility]    This note was completed in part using a voice recognition software, any grammatical or context distortion are unintentional and inherent to the software.       Again, thank you for allowing me to participate in the care of your patient.        Sincerely,        Karlos Dhillon PA-C

## 2024-01-04 NOTE — PATIENT INSTRUCTIONS
"      Today we discussed the underlying etiology/pathology of patient's   1. Jumpshade's knee of right side      -Discussed patient's x-rays today of her right knee which are largely unremarkable.  X-rays are a little bit subpar in technique.  You could say that she maybe has narrowing of the lateral compartment but this is not where her pain is.  - Patient is point tender over the infrapatellar tendon of the extensor mechanism.  No mechanical symptoms.  - Patient understood inflammation of the patellar tendon and treatment strategies.  - Patient will be referred to physical therapy for rehab and treatment program.  Scheduling will call the patient within the next 3 business days.  - Patient should utilize ice to the anterior knee for at least 10-15 minutes at a time with appropriate skin barrier to help decrease inflammation and pain.  - She should avoid stressful activities such as repetitive steps, kneeling, squats and lunging  - Patient will start diclofenac 50 mg tablet to be taken 1 tablet every 12 hours to decrease inflammation  - She may use topical agents as desired but we did discuss using/purchasing over-the-counter Voltaren 1% gel and may apply it to the anterior knee tissues every 8 hours.  Patient should avoid soaking or washing this off for at least 1 hour after application to allow tissue penetration.  - If patient is not improving as expected over the next 4-5 weeks I like to see her back for repeat assessment.  If patient is doing well patient does not need formal clinical follow-up appointment.  - We discussed the 1: 4 rule in regards to stress and strain on joints with increased carrying of weight.  - With ambulation of steps she should lead with her left leg going up and lead with her right leg going down \" up with the good, down with the bad\"    -Call direct clinic number [[phone]] at any time with questions or concerns in regards to your recent office visit with me.     Karlos Dhillon " MARIA ISABEL  [facility]

## 2024-01-23 ASSESSMENT — ACTIVITIES OF DAILY LIVING (ADL)
PAIN: THE SYMPTOM AFFECTS MY ACTIVITY MODERATELY
RISE FROM A CHAIR: ACTIVITY IS MINIMALLY DIFFICULT
KNEEL ON THE FRONT OF YOUR KNEE: ACTIVITY IS VERY DIFFICULT
GIVING WAY, BUCKLING OR SHIFTING OF KNEE: THE SYMPTOM AFFECTS MY ACTIVITY SLIGHTLY
SWELLING: THE SYMPTOM AFFECTS MY ACTIVITY SLIGHTLY
SWELLING: THE SYMPTOM AFFECTS MY ACTIVITY SLIGHTLY
GO UP STAIRS: ACTIVITY IS MINIMALLY DIFFICULT
HOW_WOULD_YOU_RATE_THE_CURRENT_FUNCTION_OF_YOUR_KNEE_DURING_YOUR_USUAL_DAILY_ACTIVITIES_ON_A_SCALE_FROM_0_TO_100_WITH_100_BEING_YOUR_LEVEL_OF_KNEE_FUNCTION_PRIOR_TO_YOUR_INJURY_AND_0_BEING_THE_INABILITY_TO_PERFORM_ANY_OF_YOUR_USUAL_DAILY_ACTIVITIES?: 70
HOW_WOULD_YOU_RATE_THE_OVERALL_FUNCTION_OF_YOUR_KNEE_DURING_YOUR_USUAL_DAILY_ACTIVITIES?: ABNORMAL
AS_A_RESULT_OF_YOUR_KNEE_INJURY,_HOW_WOULD_YOU_RATE_YOUR_CURRENT_LEVEL_OF_DAILY_ACTIVITY?: ABNORMAL
GO DOWN STAIRS: ACTIVITY IS MINIMALLY DIFFICULT
RISE FROM A CHAIR: ACTIVITY IS MINIMALLY DIFFICULT
STAND: ACTIVITY IS MINIMALLY DIFFICULT
STAND: ACTIVITY IS MINIMALLY DIFFICULT
KNEE_ACTIVITY_OF_DAILY_LIVING_SUM: 43
KNEE_ACTIVITY_OF_DAILY_LIVING_SCORE: 61.43
GO UP STAIRS: ACTIVITY IS MINIMALLY DIFFICULT
WALK: ACTIVITY IS MINIMALLY DIFFICULT
PLEASE_INDICATE_YOR_PRIMARY_REASON_FOR_REFERRAL_TO_THERAPY:: KNEE
LIMPING: THE SYMPTOM AFFECTS MY ACTIVITY MODERATELY
HOW_WOULD_YOU_RATE_THE_OVERALL_FUNCTION_OF_YOUR_KNEE_DURING_YOUR_USUAL_DAILY_ACTIVITIES?: ABNORMAL
SIT WITH YOUR KNEE BENT: ACTIVITY IS MINIMALLY DIFFICULT
STIFFNESS: THE SYMPTOM AFFECTS MY ACTIVITY SLIGHTLY
WEAKNESS: THE SYMPTOM AFFECTS MY ACTIVITY SLIGHTLY
WALK: ACTIVITY IS MINIMALLY DIFFICULT
PAIN: THE SYMPTOM AFFECTS MY ACTIVITY MODERATELY
GO DOWN STAIRS: ACTIVITY IS MINIMALLY DIFFICULT
LIMPING: THE SYMPTOM AFFECTS MY ACTIVITY MODERATELY
SQUAT: ACTIVITY IS FAIRLY DIFFICULT
WEAKNESS: THE SYMPTOM AFFECTS MY ACTIVITY SLIGHTLY
GIVING WAY, BUCKLING OR SHIFTING OF KNEE: THE SYMPTOM AFFECTS MY ACTIVITY SLIGHTLY
SQUAT: ACTIVITY IS FAIRLY DIFFICULT
HOW_WOULD_YOU_RATE_THE_CURRENT_FUNCTION_OF_YOUR_KNEE_DURING_YOUR_USUAL_DAILY_ACTIVITIES_ON_A_SCALE_FROM_0_TO_100_WITH_100_BEING_YOUR_LEVEL_OF_KNEE_FUNCTION_PRIOR_TO_YOUR_INJURY_AND_0_BEING_THE_INABILITY_TO_PERFORM_ANY_OF_YOUR_USUAL_DAILY_ACTIVITIES?: 70
RAW_SCORE: 43
KNEEL ON THE FRONT OF YOUR KNEE: ACTIVITY IS VERY DIFFICULT
SIT WITH YOUR KNEE BENT: ACTIVITY IS MINIMALLY DIFFICULT
STIFFNESS: THE SYMPTOM AFFECTS MY ACTIVITY SLIGHTLY
AS_A_RESULT_OF_YOUR_KNEE_INJURY,_HOW_WOULD_YOU_RATE_YOUR_CURRENT_LEVEL_OF_DAILY_ACTIVITY?: ABNORMAL

## 2024-01-24 ENCOUNTER — THERAPY VISIT (OUTPATIENT)
Dept: PHYSICAL THERAPY | Facility: CLINIC | Age: 39
End: 2024-01-24
Attending: PHYSICIAN ASSISTANT
Payer: COMMERCIAL

## 2024-01-24 DIAGNOSIS — M76.51 JUMPER'S KNEE OF RIGHT SIDE: ICD-10-CM

## 2024-01-24 PROCEDURE — 97161 PT EVAL LOW COMPLEX 20 MIN: CPT | Mod: GP | Performed by: PHYSICAL THERAPIST

## 2024-01-24 PROCEDURE — 97035 APP MDLTY 1+ULTRASOUND EA 15: CPT | Mod: GP | Performed by: PHYSICAL THERAPIST

## 2024-01-24 PROCEDURE — 97110 THERAPEUTIC EXERCISES: CPT | Mod: GP | Performed by: PHYSICAL THERAPIST

## 2024-01-24 NOTE — PROGRESS NOTES
PHYSICAL THERAPY EVALUATION  Type of Visit: Evaluation  Onset of right jumper's knee 6 months ago secondary to activity. Pt has recently noticed increased pain since starting playing pickle ball. Pt referred for physical therapy on 24  See electronic medical record for Abuse and Falls Screening details.    Subjective       Presenting condition or subjective complaint: Knee pain  Date of onset:      Relevant medical history: Asthma; Migraines or headaches   Dates & types of surgery: None    Prior diagnostic imaging/testing results: X-ray     Prior therapy history for the same diagnosis, illness or injury: No      Prior Level of Function  Transfers: Independent  Ambulation: Independent  ADL: Independent  IADL: Driving, Housekeeping, Work    Living Environment  Social support: With a significant other or spouse   Type of home: House   Stairs to enter the home: Yes 2 Is there a railing: No   Ramp: No   Stairs inside the home: Yes 12 Is there a railing: Yes   Help at home: None  Equipment owned:       Employment: Yes Therapist  Hobbies/Interests: Fishing, travel, camping,    Patient goals for therapy: Live pain free - previous level of functioning.    Pain assessment: Pain present  Location: right anterior knee /Ratin/10     Objective   KNEE:    Standing Posture: genu recurvatum          Gait: weak pelvic stabilizers. Pt lacks full extension of the right knee     Functional:   - Squat/ SL Squat: limited and painful   - Lateral Step Down:             AROM: (* indicates patient's pain)   PROM:(over pressure)   L  R L R   Hyperextension    0  0   Extension    0  4   Flexion    98  110     Strength:   L R   HIP     Flex  4/5   Ext  5/5   ABd  4/5   KNEE     Flex  5/5   Ext  4/5     Hip PROM:     L R   Flexion     Extension     IR     ER     Odell's  +   Hamstring 90-90     Roni         Special tests:   L R   Sweep Test     Anterior Drawer     Dial Test     Posterior Drawer     Lachman's     Valgus 0 degrees      Valgus 30 degrees     Varus 0 degrees     Varus 30 degrees     Justin's     Appley's     Lateral Compression     Patellar Compression     SLR     Slump         Palpation: point tenderness inferior patellar border and patellar tendon    Patellar tracking: lateral glide of the patella          Assessment & Plan   CLINICAL IMPRESSIONS  Medical Diagnosis: right jumper's knee    Treatment Diagnosis: right knee pain, decreased ROM/strength   Impression/Assessment: Patient is a 38 year old female with right knee  complaints.  The following significant findings have been identified: Pain, Decreased ROM/flexibility, and Decreased strength. These impairments interfere with their ability to perform self care tasks, work tasks, recreational activities, household chores, driving , household mobility, and community mobility as compared to previous level of function.     Clinical Decision Making (Complexity):  Clinical Presentation: Stable/Uncomplicated  Clinical Presentation Rationale: based on medical and personal factors listed in PT evaluation  Clinical Decision Making (Complexity): Low complexity    PLAN OF CARE  Treatment Interventions:  Modalities: Cryotherapy, Ultrasound  Interventions: Therapeutic Exercise    Long Term Goals     PT Goal 1  Goal Identifier: stairs  Goal Description: pt able to ascend/descend 10 stairs pain level 2  Rationale: to maximize safety and independence with performance of ADLs and functional tasks;to maximize safety and independence within the home;to maximize safety and independence within the community;to maximize safety and independence with transportation;to maximize safety and independence with self cares  Target Date: 03/08/24      Frequency of Treatment: 1x/week  Duration of Treatment: 6 weeks    Recommended Referrals to Other Professionals:   Education Assessment:   Learner/Method: No Barriers to Learning    Risks and benefits of evaluation/treatment have been explained.    Patient/Family/caregiver agrees with Plan of Care.     Evaluation Time:             Signing Clinician: Yosef Ryan PT

## 2024-01-31 ENCOUNTER — THERAPY VISIT (OUTPATIENT)
Dept: PHYSICAL THERAPY | Facility: CLINIC | Age: 39
End: 2024-01-31
Attending: PHYSICIAN ASSISTANT
Payer: COMMERCIAL

## 2024-01-31 DIAGNOSIS — M76.51 JUMPER'S KNEE OF RIGHT SIDE: Primary | ICD-10-CM

## 2024-01-31 PROCEDURE — 97035 APP MDLTY 1+ULTRASOUND EA 15: CPT | Mod: GP | Performed by: PHYSICAL THERAPIST

## 2024-01-31 PROCEDURE — 97110 THERAPEUTIC EXERCISES: CPT | Mod: GP | Performed by: PHYSICAL THERAPIST

## 2024-02-02 ENCOUNTER — MYC MEDICAL ADVICE (OUTPATIENT)
Dept: ORTHOPEDICS | Facility: CLINIC | Age: 39
End: 2024-02-02
Payer: COMMERCIAL

## 2024-02-02 DIAGNOSIS — M76.51 JUMPER'S KNEE OF RIGHT SIDE: ICD-10-CM

## 2024-02-06 ENCOUNTER — MYC REFILL (OUTPATIENT)
Dept: FAMILY MEDICINE | Facility: CLINIC | Age: 39
End: 2024-02-06
Payer: COMMERCIAL

## 2024-02-06 DIAGNOSIS — Z30.44 ENCOUNTER FOR SURVEILLANCE OF VAGINAL RING HORMONAL CONTRACEPTIVE DEVICE: Primary | ICD-10-CM

## 2024-02-07 ENCOUNTER — THERAPY VISIT (OUTPATIENT)
Dept: PHYSICAL THERAPY | Facility: CLINIC | Age: 39
End: 2024-02-07
Attending: PHYSICIAN ASSISTANT
Payer: COMMERCIAL

## 2024-02-07 DIAGNOSIS — M76.51 JUMPER'S KNEE OF RIGHT SIDE: Primary | ICD-10-CM

## 2024-02-07 PROCEDURE — 97110 THERAPEUTIC EXERCISES: CPT | Mod: GP | Performed by: PHYSICAL THERAPIST

## 2024-02-07 PROCEDURE — 97035 APP MDLTY 1+ULTRASOUND EA 15: CPT | Mod: GP | Performed by: PHYSICAL THERAPIST

## 2024-02-07 RX ORDER — ETONOGESTREL AND ETHINYL ESTRADIOL .12; .015 MG/D; MG/D
1 RING VAGINAL
Qty: 3 EACH | Refills: 0 | Status: SHIPPED | OUTPATIENT
Start: 2024-02-07 | End: 2024-04-28

## 2024-02-26 ENCOUNTER — OFFICE VISIT (OUTPATIENT)
Dept: FAMILY MEDICINE | Facility: CLINIC | Age: 39
End: 2024-02-26
Payer: COMMERCIAL

## 2024-02-26 VITALS
TEMPERATURE: 98 F | DIASTOLIC BLOOD PRESSURE: 81 MMHG | HEART RATE: 87 BPM | WEIGHT: 279 LBS | OXYGEN SATURATION: 100 % | RESPIRATION RATE: 16 BRPM | SYSTOLIC BLOOD PRESSURE: 123 MMHG | BODY MASS INDEX: 44.84 KG/M2 | HEIGHT: 66 IN

## 2024-02-26 DIAGNOSIS — M77.11 LATERAL EPICONDYLITIS OF RIGHT ELBOW: Primary | ICD-10-CM

## 2024-02-26 PROCEDURE — 99213 OFFICE O/P EST LOW 20 MIN: CPT | Performed by: PHYSICIAN ASSISTANT

## 2024-02-26 NOTE — PROGRESS NOTES
Assessment & Plan     Lateral epicondylitis of right elbow    Since at home treatment hasn't been helping, refer to ortho. Continue using the brace, ice, and ibuprofen until appointment.    - Orthopedic  Referral; Future                  Subjective   Maine is a 38 year old, presenting for the following health issues:  Musculoskeletal Problem      2/26/2024     2:14 PM   Additional Questions   Roomed by Radha     Musculoskeletal Problem    History of Present Illness       Reason for visit:  Pain in right arm/ elbow.  Symptom onset:  More than a month  Symptoms include:  Started with pain in elbow when playing pickleball, bow pain in forearm, hand, and shoulder, all the time, when not using.  Symptom intensity:  Moderate  Symptom progression:  Worsening  Had these symptoms before:  No  What makes it worse:  Lifting almost anything.  What makes it better:  Ice    She eats 0-1 servings of fruits and vegetables daily.She consumes 0 sweetened beverage(s) daily.She exercises with enough effort to increase her heart rate 30 to 60 minutes per day.  She exercises with enough effort to increase her heart rate 4 days per week.   She is taking medications regularly.      Musculoskeletal problem/pain  Onset/Duration: 5 weeks  Description  Location: elbow - right  Joint Swelling: YES  Redness: No  Pain: YES  Warmth: No  Accompanying signs and symptoms:   Fevers: No  Numbness/tingling/weakness: YES- tingling and weakness- moving into right foremarm  History  Trauma to the area: No  Recent illness:  No  Previous similar problem: No  Previous evaluation:  No  Therapies tried and outcome: ice and Ibuprofen, tennis elbow strep for 3 weeks without improvement, biofreeze helps temporarily        Review of Systems  Constitutional, HEENT, cardiovascular, pulmonary, gi and gu systems are negative, except as otherwise noted.      Objective    /81 (BP Location: Right arm, Patient Position: Chair, Cuff Size: Adult Regular)   " Pulse 87   Temp 98  F (36.7  C) (Oral)   Resp 16   Ht 1.676 m (5' 6\")   Wt 126.6 kg (279 lb)   SpO2 100%   BMI 45.03 kg/m    Body mass index is 45.03 kg/m .      Physical Exam   GENERAL: alert and no distress  EYES: Eyes grossly normal to inspection, PERRL and conjunctivae and sclerae normal  MS: no gross musculoskeletal defects noted, no edema  ORTHO: Elbow Exam: Inspection: no swelling, no ecchymosis, no olecranon bursa swelling, no distal bicep tendon defect  Tender: lateral epicondyle and common extensor tendon  Non-tender: medial epicondyle and olecranon bursa  Range of Motion: all normal  Strength: elbow strength full  SKIN: no suspicious lesions or rashes  NEURO: Normal strength and tone, mentation intact and speech normal  PSYCH: mentation appears normal, affect normal/bright            Signed Electronically by: Oniel Choe PA-C    "

## 2024-02-28 NOTE — PROGRESS NOTES
ASSESSMENT & PLAN       Today we discussed the underlying etiology/pathology of patient's   1. Lateral epicondylitis of right elbow      -We discussed patient is dealing with right elbow lateral epicondylitis.  We discussed underlying etiology as well as treatment for this condition.  - Patient has been utilizing ibuprofen 800 milligram prescription tablets 1 tablet twice daily but prescription is at least 2 years old.  We discussed decreased potency and effectiveness of old medications.  Patient also initially tried using some diclofenac which did not seem to help her current condition much which she has for her previous patellar tendinitis of the knee.  -We discussed mainstays of treatment including resting her elbow avoiding activities such as pickleball and repetitive wrist motion for at least 2 to 3 weeks.  She will continue with a tennis elbow  strap.  She will continue to ice.  She will either update her ibuprofen medication or may use her diclofenac.  - Patient will be referred to occupational therapy for reconditioning treatment program.  We did discuss the indication of possible use of iontophoresis at which time her occupational therapist or patient herself would need to reach out to me to prescribe dexamethasone to be sent to her pharmacy for use at OT sessions.  - We discussed indications for corticosteroid injection locally to the lateral condyle as well as risk and benefits of this.  - Patient will follow-up with me as needed for continued management of this condition.    -Call direct clinic number [[phone]] at any time with questions or concerns in regards to your recent office visit with me.     Karlos Dhillon PA-C  [facility]        SUBJECTIVE  Maine J Дмитрий is a/an 38 year old female who is seen in consultation at the request of  Oniel Choe PA-C for evaluation of right elbow pain. The patient is seen by themselves.    Onset: 1 month(s) ago. Patient  describes injury as playing more intense pickleball recently.  Patient is playing pickle ball least twice a week for numerous hours at a time.  She is becoming more aggressive in her play style and for a longer duration.  Patient does have some diclofenac still left over from her patellar tendinitis treated within the last couple months but this is not controlling her current symptoms.  Location of Pain: right elbow - lateral epicondyle;  no numbness/tingling  Rating of Pain at worst: 8/10  Rating of Pain Currently: 4/10  Worsened by: overuse, lifting, /grasp strength weakened,   Better with: strap is helpful and mild with medication  Treatments tried: rest/activity avoidance, ice, Tylenol, ibuprofen, and lateral epicondylitis strap, Biofreeze  Quality: constant aching, occasional sharp, stabbing  Associated symptoms: swelling and weakness of the arm with lifting and use (gripping)  Orthopedic history: NO  Relevant surgical history: NO  Social history: social history: works at mental health therapist; 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 No     Social Determinants of Health     Financial Resource Strain: Low Risk  (11/29/2023)    Financial Resource Strain     Within the past 12 months, have you or your family members you live with been unable to get utilities (heat, electricity) when it was really needed?: No   Food Insecurity: Low Risk  (11/29/2023)    Food  Insecurity     Within the past 12 months, did you worry that your food would run out before you got money to buy more?: No     Within the past 12 months, did the food you bought just not last and you didn t have money to get more?: No   Transportation Needs: Low Risk  (11/29/2023)    Transportation Needs     Within the past 12 months, has lack of transportation kept you from medical appointments, getting your medicines, non-medical meetings or appointments, work, or from getting things that you need?: No   Interpersonal Safety: Low Risk  (11/29/2023)    Interpersonal Safety     Do you feel physically and emotionally safe where you currently live?: Yes     Within the past 12 months, have you been hit, slapped, kicked or otherwise physically hurt by someone?: No     Within the past 12 months, have you been humiliated or emotionally abused in other ways by your partner or ex-partner?: No   Housing Stability: Low Risk  (11/29/2023)    Housing Stability     Do you have housing? : Yes     Are you worried about losing your housing?: No         Patient's past medical, surgical, social, and family histories were personally reviewed today and no changes are noted.    REVIEW OF SYSTEMS:  10 point ROS is negative other than symptoms noted above in HPI, Past Medical History or as stated below  Constitutional: NEGATIVE for fever, chills, change in weight  Skin: NEGATIVE for worrisome rashes, moles or lesions  GI/: NEGATIVE for bowel or bladder changes  Neuro: NEGATIVE for weakness, dizziness or paresthesias    OBJECTIVE:  Vital signs as noted in EPIC for 2/28/2024  General: healthy, alert and in no distress  HEENT: no scleral icterus or conjunctival erythema  Skin: no suspicious lesions or rash. No jaundice.  CV: no pedal edema  Resp: normal respiratory effort without conversational dyspnea   Psych: normal mood and affect  Gait: normal steady gait with appropriate coordination and balance  Neuro: Normal light sensory exam of  lower extremity      MSK:  Exam shows well-nourished 38-year-old female who ablates full weightbearing without assistive device.  Examination of the right upper extremity shows marks on her forearm consistent with use of a tennis elbow strap.  Patient has some slight stiffness of the elbow with terminal extension with some tightness in the anterior cubital fossa and mild discomfort over the bicep tendon distally but negative hook sign.  Slight tenderness noted over the medial epicondyle and more point tenderness over the lateral condyle at the insertion of the extensor wad.  Wrist extension against resistance as well as extension of digits 2 and 3 generate significant lateral epicondylar pain.  No particular pain with pronation or supination against resistance.  No pain with wrist flexion or  and grasp strength.  She is neurovascularly intact including the ulnar, radial and median nerve.  No pain with testing of the bicep or tricep showing 5 out of 5 motor tone in all planes of the elbow and wrist.  Radial pulses +2 and symmetric.        Independent visualization of the below image:  Three-view x-ray of the patient's right elbow are personally reviewed and reviewed with the patient showing joint spaces maintained.  No evidence of fracture or dislocation.  Slight cortical irregularity noted on AP view of the medial corner of the ulna.  Joint spaces maintained.  No evidence of calcific tendinitis.    Patient's conditions were thoroughly discussed during today's visit with total time reviewing patient's previous medical records/history/radiology, face-to-face examination and discussion and plan of care with the patient and documentation being 30 minutes.    Karlos Dhillon PA-C  [facility]    This note was completed in part using a voice recognition software, any grammatical or context distortion are unintentional and inherent to the software.

## 2024-03-01 ENCOUNTER — OFFICE VISIT (OUTPATIENT)
Dept: ORTHOPEDICS | Facility: CLINIC | Age: 39
End: 2024-03-01
Attending: PHYSICIAN ASSISTANT
Payer: COMMERCIAL

## 2024-03-01 ENCOUNTER — THERAPY VISIT (OUTPATIENT)
Dept: PHYSICAL THERAPY | Facility: CLINIC | Age: 39
End: 2024-03-01
Payer: COMMERCIAL

## 2024-03-01 ENCOUNTER — ANCILLARY PROCEDURE (OUTPATIENT)
Dept: GENERAL RADIOLOGY | Facility: CLINIC | Age: 39
End: 2024-03-01
Attending: PHYSICIAN ASSISTANT
Payer: COMMERCIAL

## 2024-03-01 VITALS — HEIGHT: 66 IN | BODY MASS INDEX: 44.84 KG/M2 | WEIGHT: 279 LBS

## 2024-03-01 DIAGNOSIS — M76.51 JUMPER'S KNEE OF RIGHT SIDE: Primary | ICD-10-CM

## 2024-03-01 DIAGNOSIS — M77.11 LATERAL EPICONDYLITIS OF RIGHT ELBOW: Primary | ICD-10-CM

## 2024-03-01 DIAGNOSIS — M77.11 LATERAL EPICONDYLITIS OF RIGHT ELBOW: ICD-10-CM

## 2024-03-01 PROCEDURE — 97010 HOT OR COLD PACKS THERAPY: CPT | Mod: GP | Performed by: PHYSICAL THERAPIST

## 2024-03-01 PROCEDURE — 97035 APP MDLTY 1+ULTRASOUND EA 15: CPT | Mod: GP | Performed by: PHYSICAL THERAPIST

## 2024-03-01 PROCEDURE — 97110 THERAPEUTIC EXERCISES: CPT | Mod: GP | Performed by: PHYSICAL THERAPIST

## 2024-03-01 PROCEDURE — 73080 X-RAY EXAM OF ELBOW: CPT | Mod: TC | Performed by: RADIOLOGY

## 2024-03-01 PROCEDURE — 99214 OFFICE O/P EST MOD 30 MIN: CPT | Performed by: PHYSICIAN ASSISTANT

## 2024-03-01 SDOH — HEALTH STABILITY: PHYSICAL HEALTH: ON AVERAGE, HOW MANY DAYS PER WEEK DO YOU ENGAGE IN MODERATE TO STRENUOUS EXERCISE (LIKE A BRISK WALK)?: 2 DAYS

## 2024-03-01 SDOH — HEALTH STABILITY: PHYSICAL HEALTH: ON AVERAGE, HOW MANY MINUTES DO YOU ENGAGE IN EXERCISE AT THIS LEVEL?: 120 MIN

## 2024-03-01 NOTE — PATIENT INSTRUCTIONS
Today we discussed the underlying etiology/pathology of patient's   1. Lateral epicondylitis of right elbow      -We discussed patient is dealing with right elbow lateral epicondylitis.  We discussed underlying etiology as well as treatment for this condition.  - Patient has been utilizing ibuprofen 800 milligram prescription tablets 1 tablet twice daily but prescription is at least 2 years old.  We discussed decreased potency and effectiveness of old medications.  Patient also initially tried using some diclofenac which did not seem to help her current condition much which she has for her previous patellar tendinitis of the knee.  -We discussed mainstays of treatment including resting her elbow avoiding activities such as pickleball and repetitive wrist motion for at least 2 to 3 weeks.  She will continue with a tennis elbow  strap.  She will continue to ice.  She will either update her ibuprofen medication or may use her diclofenac.  - Patient will be referred to occupational therapy for reconditioning treatment program.  We did discuss the indication of possible use of iontophoresis at which time her occupational therapist or patient herself would need to reach out to me to prescribe dexamethasone to be sent to her pharmacy for use at OT sessions.  - We discussed indications for corticosteroid injection locally to the lateral condyle as well as risk and benefits of this.  - Patient will follow-up with me as needed for continued management of this condition.    -Call direct clinic number [[phone]] at any time with questions or concerns in regards to your recent office visit with me.     Karlos Dhillon PA-C  [facility]

## 2024-03-01 NOTE — LETTER
3/1/2024         RE: Maine Choe  [address]  [facility] [address]        Dear Colleague,    Thank you for referring your patient, Maine Choe, to the [facility] [place]. Please see a copy of my visit note below.    ASSESSMENT & PLAN       Today we discussed the underlying etiology/pathology of patient's   1. Lateral epicondylitis of right elbow      -We discussed patient is dealing with right elbow lateral epicondylitis.  We discussed underlying etiology as well as treatment for this condition.  - Patient has been utilizing ibuprofen 800 milligram prescription tablets 1 tablet twice daily but prescription is at least 2 years old.  We discussed decreased potency and effectiveness of old medications.  Patient also initially tried using some diclofenac which did not seem to help her current condition much which she has for her previous patellar tendinitis of the knee.  -We discussed mainstays of treatment including resting her elbow avoiding activities such as pickleball and repetitive wrist motion for at least 2 to 3 weeks.  She will continue with a tennis elbow  strap.  She will continue to ice.  She will either update her ibuprofen medication or may use her diclofenac.  - Patient will be referred to occupational therapy for reconditioning treatment program.  We did discuss the indication of possible use of iontophoresis at which time her occupational therapist or patient herself would need to reach out to me to prescribe dexamethasone to be sent to her pharmacy for use at OT sessions.  - We discussed indications for corticosteroid injection locally to the lateral condyle as well as risk and benefits of this.  - Patient will follow-up with me as needed for continued management of this condition.    -Call direct clinic number [[phone]] at any time with questions or concerns in regards to your recent office visit with me.     Karlos Dhillon PA-C  [place]  [facility]        SUBJECTIVE  Maine Choe is a/an 38 year old female who is seen in consultation at the request of  Oniel Choe PA-C for evaluation of right elbow pain. The patient is seen by themselves.    Onset: 1 month(s) ago. Patient describes injury as playing more intense pickleball recently.  Patient is playing pickle ball least twice a week for numerous hours at a time.  She is becoming more aggressive in her play style and for a longer duration.  Patient does have some diclofenac still left over from her patellar tendinitis treated within the last couple months but this is not controlling her current symptoms.  Location of Pain: right elbow - lateral epicondyle;  no numbness/tingling  Rating of Pain at worst: 8/10  Rating of Pain Currently: 4/10  Worsened by: overuse, lifting, /grasp strength weakened,   Better with: strap is helpful and mild with medication  Treatments tried: rest/activity avoidance, ice, Tylenol, ibuprofen, and lateral epicondylitis strap, Biofreeze  Quality: constant aching, occasional sharp, stabbing  Associated symptoms: swelling and weakness of the arm with lifting and use (gripping)  Orthopedic history: NO  Relevant surgical history: NO  Social history: social history: works at mental health therapist; 1-2x/week pickleball, stretching class 1x/week    Past Medical History:   Diagnosis Date     Depressive disorder 2012     Uncomplicated asthma 2013     Social History     Socioeconomic History     Marital status:    Tobacco Use     Smoking status: Never     Smokeless tobacco: Never   Vaping Use     Vaping Use: Never used   Substance and Sexual Activity     Alcohol use: Yes     Drug use: Not Currently     Types: Marijuana     Sexual activity: Yes     Partners: Male     Birth control/protection: Inserts/Ring   Other Topics Concern     Parent/sibling w/ CABG, MI or angioplasty before 65F 55M? No     Social Determinants of Health     Financial  Resource Strain: Low Risk  (11/29/2023)    Financial Resource Strain      Within the past 12 months, have you or your family members you live with been unable to get utilities (heat, electricity) when it was really needed?: No   Food Insecurity: Low Risk  (11/29/2023)    Food Insecurity      Within the past 12 months, did you worry that your food would run out before you got money to buy more?: No      Within the past 12 months, did the food you bought just not last and you didn t have money to get more?: No   Transportation Needs: Low Risk  (11/29/2023)    Transportation Needs      Within the past 12 months, has lack of transportation kept you from medical appointments, getting your medicines, non-medical meetings or appointments, work, or from getting things that you need?: No   Interpersonal Safety: Low Risk  (11/29/2023)    Interpersonal Safety      Do you feel physically and emotionally safe where you currently live?: Yes      Within the past 12 months, have you been hit, slapped, kicked or otherwise physically hurt by someone?: No      Within the past 12 months, have you been humiliated or emotionally abused in other ways by your partner or ex-partner?: No   Housing Stability: Low Risk  (11/29/2023)    Housing Stability      Do you have housing? : Yes      Are you worried about losing your housing?: No         Patient's past medical, surgical, social, and family histories were personally reviewed today and no changes are noted.    REVIEW OF SYSTEMS:  10 point ROS is negative other than symptoms noted above in HPI, Past Medical History or as stated below  Constitutional: NEGATIVE for fever, chills, change in weight  Skin: NEGATIVE for worrisome rashes, moles or lesions  GI/: NEGATIVE for bowel or bladder changes  Neuro: NEGATIVE for weakness, dizziness or paresthesias    OBJECTIVE:  Vital signs as noted in EPIC for 2/28/2024  General: healthy, alert and in no distress  HEENT: no scleral icterus or  conjunctival erythema  Skin: no suspicious lesions or rash. No jaundice.  CV: no pedal edema  Resp: normal respiratory effort without conversational dyspnea   Psych: normal mood and affect  Gait: normal steady gait with appropriate coordination and balance  Neuro: Normal light sensory exam of lower extremity      MSK:  Exam shows well-nourished 38-year-old female who ablates full weightbearing without assistive device.  Examination of the right upper extremity shows marks on her forearm consistent with use of a tennis elbow strap.  Patient has some slight stiffness of the elbow with terminal extension with some tightness in the anterior cubital fossa and mild discomfort over the bicep tendon distally but negative hook sign.  Slight tenderness noted over the medial epicondyle and more point tenderness over the lateral condyle at the insertion of the extensor wad.  Wrist extension against resistance as well as extension of digits 2 and 3 generate significant lateral epicondylar pain.  No particular pain with pronation or supination against resistance.  No pain with wrist flexion or  and grasp strength.  She is neurovascularly intact including the ulnar, radial and median nerve.  No pain with testing of the bicep or tricep showing 5 out of 5 motor tone in all planes of the elbow and wrist.  Radial pulses +2 and symmetric.        Independent visualization of the below image:  Three-view x-ray of the patient's right elbow are personally reviewed and reviewed with the patient showing joint spaces maintained.  No evidence of fracture or dislocation.  Slight cortical irregularity noted on AP view of the medial corner of the ulna.  Joint spaces maintained.  No evidence of calcific tendinitis.    Patient's conditions were thoroughly discussed during today's visit with total time reviewing patient's previous medical records/history/radiology, face-to-face examination and discussion and plan of care with the patient and  documentation being 30 minutes.    Karlos Dhillon PA-C  [facility]    This note was completed in part using a voice recognition software, any grammatical or context distortion are unintentional and inherent to the software.       Again, thank you for allowing me to participate in the care of your patient.        Sincerely,        Karlos Dhillon PA-C

## 2024-03-10 ENCOUNTER — HEALTH MAINTENANCE LETTER (OUTPATIENT)
Age: 39
End: 2024-03-10

## 2024-03-28 ENCOUNTER — MYC REFILL (OUTPATIENT)
Dept: FAMILY MEDICINE | Facility: CLINIC | Age: 39
End: 2024-03-28
Payer: COMMERCIAL

## 2024-03-28 DIAGNOSIS — L70.9 ACNE, UNSPECIFIED ACNE TYPE: ICD-10-CM

## 2024-03-28 RX ORDER — SPIRONOLACTONE 50 MG/1
50 TABLET, FILM COATED ORAL
Qty: 270 TABLET | Refills: 0 | Status: SHIPPED | OUTPATIENT
Start: 2024-03-28 | End: 2024-05-20

## 2024-04-16 PROBLEM — M76.51 JUMPER'S KNEE OF RIGHT SIDE: Status: RESOLVED | Noted: 2024-01-24 | Resolved: 2024-04-16

## 2024-04-16 NOTE — PROGRESS NOTES
03/01/24 0500   Appointment Info   Signing clinician's name / credentials malinda ramos pt   Total/Authorized Visits 6   Visits Used 4   Medical Diagnosis right jumper's knee   PT Tx Diagnosis right knee pain, decreased ROM/strength   Progress Note/Certification   Therapy Frequency 1x/week   Predicted Duration 6 weeks   PT Goal 1   Goal Identifier stairs   Goal Description pt able to ascend/descend 10 stairs pain level 2   Rationale to maximize safety and independence with performance of ADLs and functional tasks;to maximize safety and independence within the home;to maximize safety and independence within the community;to maximize safety and independence with transportation;to maximize safety and independence with self cares   Target Date 03/08/24   Subjective Report   Subjective Report knee had been improving until yesterday after playing pickle ball. pt rates pain as 4/10   Objective Measures   Objective Measures Objective Measure 1   Objective Measure 1   Objective Measure point tenderness and swelling remain right patellar tendon. pt demonstrates improved strength hip adductors, quadriceps and glut medias   PT Modalities   PT Modalities Cryotherapy;Ultrasound   Cryotherapy   Treatment Detail pack 10 minutes supine   Cryotherapy Minutes (67957) 10   Ultrasound   Ultrasound Minutes (86298) 8   Ultrasound -Type (does not include 3-5 min prep/cleanup time) Pulsed 50%   Intensity 1.2   Duration (does not include the 3-5 min set up/clean up time) 8 min   Frequency 2 MHz   Location right patellar tendon   Positioning supine   Treatment Interventions (PT)   Interventions Therapeutic Procedure/Exercise   Therapeutic Procedure/Exercise   Therapeutic Procedures: strength, endurance, ROM, flexibility minutes (83344) 25   Therapeutic Procedures Ther Proc 4;Ther Proc 5;Ther Proc 6;Ther Proc 7   Ther Proc 1 leg press seat 4 20 x 60# 2 up 1 down   Ther Proc 1 - Details bike 5 minutes   Ther Proc 4 clam 20 x ag   Ther Proc 4  - Details agility ladder 3 minutes   Ther Proc 5 abduction 20x ag   PTRx Ther Proc 1 wall ball   PTRx Ther Proc 1 - Details 20 x 5 seconds   PTRx Ther Proc 2 Hip Flexion Straight Leg Raise   PTRx Ther Proc 2 - Details 30 x ag   PTRx Ther Proc 3 Supine Heel Slides   PTRx Ther Proc 3 - Details 5 x 10 seconds    Skilled Intervention verbal and tactile cueing   Patient Response/Progress tolerated well   Ther Proc 5 - Details supported knee bends 15 x   Therapeutic Activity   PTRx Ther Act 1 Icing   PTRx Ther Act 1 - Details No Notes   Education   Learner/Method No Barriers to Learning   Plan   Home program see PTRX   Plan for next session progress as tolerated   Total Session Time   Timed Code Treatment Minutes 33   Total Treatment Time (sum of timed and untimed services) 43         DISCHARGE  Reason for Discharge: Patient has failed to schedule further appointments.    Equipment Issued: none    Discharge Plan: Patient to continue home program.    Referring Provider:  Karlos Dhillon

## 2024-04-28 ENCOUNTER — MYC REFILL (OUTPATIENT)
Dept: FAMILY MEDICINE | Facility: CLINIC | Age: 39
End: 2024-04-28
Payer: COMMERCIAL

## 2024-04-28 DIAGNOSIS — Z30.44 ENCOUNTER FOR SURVEILLANCE OF VAGINAL RING HORMONAL CONTRACEPTIVE DEVICE: ICD-10-CM

## 2024-04-29 RX ORDER — ETONOGESTREL/ETHINYL ESTRADIOL .12-.015MG
RING, VAGINAL VAGINAL
OUTPATIENT
Start: 2024-04-29

## 2024-04-29 RX ORDER — ETONOGESTREL AND ETHINYL ESTRADIOL .12; .015 MG/D; MG/D
1 RING VAGINAL
Qty: 3 EACH | Refills: 0 | Status: SHIPPED | OUTPATIENT
Start: 2024-04-29 | End: 2024-07-11

## 2024-04-29 NOTE — TELEPHONE ENCOUNTER
Refilling one more time to get to upcoming appointment.    Oniel Choe PA-C on 4/29/2024 at 10:18 AM (covering for Saundra Mayo PA-C)

## 2024-05-20 ENCOUNTER — MYC REFILL (OUTPATIENT)
Dept: FAMILY MEDICINE | Facility: CLINIC | Age: 39
End: 2024-05-20
Payer: COMMERCIAL

## 2024-05-20 DIAGNOSIS — L70.9 ACNE, UNSPECIFIED ACNE TYPE: ICD-10-CM

## 2024-05-20 RX ORDER — SPIRONOLACTONE 50 MG/1
50 TABLET, FILM COATED ORAL
Qty: 270 TABLET | Refills: 0 | Status: SHIPPED | OUTPATIENT
Start: 2024-05-20 | End: 2024-07-11

## 2024-06-26 ENCOUNTER — ANCILLARY PROCEDURE (OUTPATIENT)
Dept: MAMMOGRAPHY | Facility: CLINIC | Age: 39
End: 2024-06-26
Payer: COMMERCIAL

## 2024-06-26 DIAGNOSIS — Z12.31 VISIT FOR SCREENING MAMMOGRAM: ICD-10-CM

## 2024-06-26 PROCEDURE — 77067 SCR MAMMO BI INCL CAD: CPT | Mod: TC | Performed by: RADIOLOGY

## 2024-06-26 PROCEDURE — 77063 BREAST TOMOSYNTHESIS BI: CPT | Mod: TC | Performed by: RADIOLOGY

## 2024-07-07 SDOH — HEALTH STABILITY: PHYSICAL HEALTH: ON AVERAGE, HOW MANY MINUTES DO YOU ENGAGE IN EXERCISE AT THIS LEVEL?: 30 MIN

## 2024-07-07 SDOH — HEALTH STABILITY: PHYSICAL HEALTH: ON AVERAGE, HOW MANY DAYS PER WEEK DO YOU ENGAGE IN MODERATE TO STRENUOUS EXERCISE (LIKE A BRISK WALK)?: 4 DAYS

## 2024-07-07 ASSESSMENT — SOCIAL DETERMINANTS OF HEALTH (SDOH): HOW OFTEN DO YOU GET TOGETHER WITH FRIENDS OR RELATIVES?: TWICE A WEEK

## 2024-07-11 ENCOUNTER — OFFICE VISIT (OUTPATIENT)
Dept: FAMILY MEDICINE | Facility: CLINIC | Age: 39
End: 2024-07-11
Payer: COMMERCIAL

## 2024-07-11 ENCOUNTER — TELEPHONE (OUTPATIENT)
Dept: FAMILY MEDICINE | Facility: CLINIC | Age: 39
End: 2024-07-11

## 2024-07-11 VITALS
HEIGHT: 66 IN | WEIGHT: 266.8 LBS | RESPIRATION RATE: 16 BRPM | SYSTOLIC BLOOD PRESSURE: 110 MMHG | OXYGEN SATURATION: 97 % | DIASTOLIC BLOOD PRESSURE: 77 MMHG | TEMPERATURE: 97.8 F | HEART RATE: 77 BPM | BODY MASS INDEX: 42.88 KG/M2

## 2024-07-11 DIAGNOSIS — F32.A DEPRESSION, UNSPECIFIED DEPRESSION TYPE: ICD-10-CM

## 2024-07-11 DIAGNOSIS — Z00.00 ROUTINE GENERAL MEDICAL EXAMINATION AT A HEALTH CARE FACILITY: Primary | ICD-10-CM

## 2024-07-11 DIAGNOSIS — Z51.81 ENCOUNTER FOR THERAPEUTIC DRUG MONITORING: ICD-10-CM

## 2024-07-11 DIAGNOSIS — Z13.6 SCREENING FOR CARDIOVASCULAR CONDITION: ICD-10-CM

## 2024-07-11 DIAGNOSIS — Z30.44 ENCOUNTER FOR SURVEILLANCE OF VAGINAL RING HORMONAL CONTRACEPTIVE DEVICE: ICD-10-CM

## 2024-07-11 DIAGNOSIS — N89.8 VAGINAL DISCHARGE: ICD-10-CM

## 2024-07-11 DIAGNOSIS — L70.9 ACNE, UNSPECIFIED ACNE TYPE: ICD-10-CM

## 2024-07-11 DIAGNOSIS — Z12.4 CERVICAL CANCER SCREENING: ICD-10-CM

## 2024-07-11 DIAGNOSIS — F41.9 ANXIETY: ICD-10-CM

## 2024-07-11 PROBLEM — M77.10 TENNIS ELBOW: Status: ACTIVE | Noted: 2024-01-06

## 2024-07-11 PROBLEM — M76.50 PATELLAR TENDONITIS: Status: ACTIVE | Noted: 2024-01-02

## 2024-07-11 LAB
ANION GAP SERPL CALCULATED.3IONS-SCNC: 9 MMOL/L (ref 7–15)
BUN SERPL-MCNC: 15.1 MG/DL (ref 6–20)
CALCIUM SERPL-MCNC: 9.5 MG/DL (ref 8.6–10)
CHLORIDE SERPL-SCNC: 103 MMOL/L (ref 98–107)
CHOLEST SERPL-MCNC: 180 MG/DL
CLUE CELLS: ABNORMAL
CREAT SERPL-MCNC: 0.87 MG/DL (ref 0.51–0.95)
DEPRECATED HCO3 PLAS-SCNC: 25 MMOL/L (ref 22–29)
EGFRCR SERPLBLD CKD-EPI 2021: 85 ML/MIN/1.73M2
FASTING STATUS PATIENT QL REPORTED: YES
FASTING STATUS PATIENT QL REPORTED: YES
GLUCOSE SERPL-MCNC: 92 MG/DL (ref 70–99)
HDLC SERPL-MCNC: 64 MG/DL
LDLC SERPL CALC-MCNC: 55 MG/DL
NONHDLC SERPL-MCNC: 116 MG/DL
POTASSIUM SERPL-SCNC: 4.9 MMOL/L (ref 3.4–5.3)
SODIUM SERPL-SCNC: 137 MMOL/L (ref 135–145)
TRICHOMONAS, WET PREP: ABNORMAL
TRIGL SERPL-MCNC: 306 MG/DL
WBC'S/HIGH POWER FIELD, WET PREP: ABNORMAL
YEAST, WET PREP: ABNORMAL

## 2024-07-11 PROCEDURE — G0145 SCR C/V CYTO,THINLAYER,RESCR: HCPCS

## 2024-07-11 PROCEDURE — 80061 LIPID PANEL: CPT

## 2024-07-11 PROCEDURE — 99396 PREV VISIT EST AGE 40-64: CPT

## 2024-07-11 PROCEDURE — 99214 OFFICE O/P EST MOD 30 MIN: CPT | Mod: 25

## 2024-07-11 PROCEDURE — 87210 SMEAR WET MOUNT SALINE/INK: CPT

## 2024-07-11 PROCEDURE — 36415 COLL VENOUS BLD VENIPUNCTURE: CPT

## 2024-07-11 PROCEDURE — 80048 BASIC METABOLIC PNL TOTAL CA: CPT

## 2024-07-11 PROCEDURE — 87624 HPV HI-RISK TYP POOLED RSLT: CPT

## 2024-07-11 RX ORDER — ESCITALOPRAM OXALATE 20 MG/1
20 TABLET ORAL
Qty: 90 TABLET | Refills: 3 | Status: SHIPPED | OUTPATIENT
Start: 2024-07-11

## 2024-07-11 RX ORDER — ETONOGESTREL AND ETHINYL ESTRADIOL .12; .015 MG/D; MG/D
1 RING VAGINAL
Qty: 3 EACH | Refills: 3 | Status: SHIPPED | OUTPATIENT
Start: 2024-07-11

## 2024-07-11 RX ORDER — SPIRONOLACTONE 50 MG/1
50 TABLET, FILM COATED ORAL
Qty: 270 TABLET | Refills: 3 | Status: SHIPPED | OUTPATIENT
Start: 2024-07-11

## 2024-07-11 ASSESSMENT — ANXIETY QUESTIONNAIRES
GAD7 TOTAL SCORE: 13
2. NOT BEING ABLE TO STOP OR CONTROL WORRYING: SEVERAL DAYS
5. BEING SO RESTLESS THAT IT IS HARD TO SIT STILL: MORE THAN HALF THE DAYS
IF YOU CHECKED OFF ANY PROBLEMS ON THIS QUESTIONNAIRE, HOW DIFFICULT HAVE THESE PROBLEMS MADE IT FOR YOU TO DO YOUR WORK, TAKE CARE OF THINGS AT HOME, OR GET ALONG WITH OTHER PEOPLE: SOMEWHAT DIFFICULT
3. WORRYING TOO MUCH ABOUT DIFFERENT THINGS: MORE THAN HALF THE DAYS
1. FEELING NERVOUS, ANXIOUS, OR ON EDGE: MORE THAN HALF THE DAYS
6. BECOMING EASILY ANNOYED OR IRRITABLE: NEARLY EVERY DAY
GAD7 TOTAL SCORE: 13
7. FEELING AFRAID AS IF SOMETHING AWFUL MIGHT HAPPEN: SEVERAL DAYS

## 2024-07-11 ASSESSMENT — PATIENT HEALTH QUESTIONNAIRE - PHQ9
5. POOR APPETITE OR OVEREATING: MORE THAN HALF THE DAYS
SUM OF ALL RESPONSES TO PHQ QUESTIONS 1-9: 5

## 2024-07-11 ASSESSMENT — PAIN SCALES - GENERAL: PAINLEVEL: NO PAIN (0)

## 2024-07-11 NOTE — PROGRESS NOTES
"Preventive Care Visit  [facility]  CRISTAL Castaneda CNP, Nurse Practitioner Primary Care  Jul 11, 2024      Assessment & Plan     Routine general medical examination at a health care facility  Health maintenance updated  - REVIEW OF HEALTH MAINTENANCE PROTOCOL ORDERS  - PRIMARY CARE FOLLOW-UP SCHEDULING; Future    Depression, unspecified depression type  Anxiety  Has been on lexapro for many years. Feels like depression is stable - PHQ9 score 5, anxiety is a little more prevalent - MARCELA score 13. Recently started individual therapy and will be doing couples therapy. Declines a medication adjustment today. Will reach out if she feels like she would benefit from a med adjustment  - escitalopram (LEXAPRO) 20 MG tablet; Take 1 tablet (20 mg) by mouth daily at 2 pm    Acne, unspecified acne type  Encounter for therapeutic drug monitoring  Stable, meds refilled and lab check  - spironolactone (ALDACTONE) 50 MG tablet; Take 1 tablet (50 mg) by mouth 3 times daily  - Basic metabolic panel  (Ca, Cl, CO2, Creat, Gluc, K, Na, BUN); Future  - Basic metabolic panel  (Ca, Cl, CO2, Creat, Gluc, K, Na, BUN)    Screening for cardiovascular condition  - Lipid panel reflex to direct LDL Fasting; Future  - Lipid panel reflex to direct LDL Fasting    Cervical cancer screening  History of abnormal pap in her early 20's. Reports all normal since then. Will try and obtain records. Pap today  - Pap Screen with HPV - Recommended Age 30 - 65 Years    Vaginal discharge  Seen on exam, patient asymptomatic  - Wet prep - Clinic Collect    Encounter for surveillance of vaginal ring hormonal contraceptive device  Refilled   - ELURYNG 0.12-0.015 MG/24HR vaginal ring; Place 1 each vaginally every 28 days          BMI  Estimated body mass index is 43.06 kg/m  as calculated from the following:    Height as of this encounter: 1.676 m (5' 6\").    Weight as of this encounter: 121 kg (266 lb 12.8 oz).   Weight management plan: " Discussed healthy diet and exercise guidelines    Counseling  Appropriate preventive services were discussed with this patient, including applicable screening as appropriate for fall prevention, nutrition, physical activity, Tobacco-use cessation, weight loss and cognition.  Checklist reviewing preventive services available has been given to the patient.  Reviewed patient's diet, addressing concerns and/or questions.   The patient's PHQ-9 score is consistent with mild depression. She was provided with information regarding depression.         Pooja Grove is a 39 year old, presenting for the following:  Physical        7/11/2024     8:03 AM   Additional Questions   Roomed by Nuha HENRY MA      Health Care Directive  Patient does not have a Health Care Directive or Living Will: Discussed advance care planning with patient; however, patient declined at this time.    HPI    Anxiety/depression - on Lexapro - has been on for many years. Things feel stable, no dose adjustment wanted. Starting individual and couples therapy, working on self care for management of anxiety/depression    Acne - takes spironolactone, works well.    Abnormal pap at [facility] in her early 20's. Has had normal paps since.        7/7/2024   General Health   How would you rate your overall physical health? Good   Feel stress (tense, anxious, or unable to sleep) Very much      (!) STRESS CONCERN      7/7/2024   Nutrition   Three or more servings of calcium each day? (!) NO   Diet: Regular (no restrictions)   How many servings of fruit and vegetables per day? (!) 0-1   How many sweetened beverages each day? 0-1          7/7/2024   Exercise   Days per week of moderate/strenous exercise 4 days   Average minutes spent exercising at this level 30 min          7/7/2024   Social Factors   Frequency of gathering with friends or relatives Twice a week   Worry food won't last until get money to buy more No   Food not last or not have enough money  for food? No   Do you have housing? (Housing is defined as stable permanent housing and does not include staying ouside in a car, in a tent, in an abandoned building, in an overnight shelter, or couch-surfing.) Yes   Are you worried about losing your housing? No   Lack of transportation? No   Unable to get utilities (heat,electricity)? No          7/7/2024   Dental   Dentist two times every year? Yes          5/30/2024   TB Screening   Were you born outside of the [place]? No          7/11/2024     9:05 AM   PHQ   PHQ-9 Total Score 5   Q9: Thoughts of better off dead/self-harm past 2 weeks Not at all          7/11/2024     9:05 AM   MARCELA-7 SCORE   Total Score 13             7/7/2024   Substance Use   Alcohol more than 3/day or more than 7/wk No   Do you use any other substances recreationally? (!) ALCOHOL      Social History     Tobacco Use    Smoking status: Former     Types: Cigarettes    Smokeless tobacco: Never    Tobacco comments:     A few cigarettes on the weekends for about 5 years, stopped at age 23   Vaping Use    Vaping status: Never Used   Substance Use Topics    Alcohol use: Yes     Comment: 2 drinks/week    Drug use: Not Currently     Types: Marijuana           6/26/2024   LAST FHS-7 RESULTS   1st degree relative breast or ovarian cancer No   Any relative bilateral breast cancer No   Any male have breast cancer No   Any ONE woman have BOTH breast AND ovarian cancer No   Any woman with breast cancer before 50yrs No   2 or more relatives with breast AND/OR ovarian cancer No   2 or more relatives with breast AND/OR bowel cancer No            7/7/2024   STI Screening   New sexual partner(s) since last STI/HIV test? No      History of abnormal Pap smear: YES - in her early 20's. All normal since. No records available     ASCVD Risk   The ASCVD Risk score (Cathy DK, et al., 2019) failed to calculate for the following reasons:    Cannot find a previous HDL lab    Cannot find a previous total cholesterol  "lab        7/7/2024   Contraception/Family Planning   Questions about contraception or family planning No        Reviewed and updated as needed this visit by Provider   Tobacco   Meds   Med Hx  Surg Hx  Fam Hx  Soc Hx Sexual Activity            Review of Systems  Constitutional, HEENT, cardiovascular, pulmonary, GI, , musculoskeletal, neuro, skin, endocrine and psych systems are negative, except as otherwise noted.     Objective    Exam  /77 (BP Location: Left arm, Patient Position: Sitting, Cuff Size: Adult Large)   Pulse 77   Temp 97.8  F (36.6  C) (Temporal)   Resp 16   Ht 1.676 m (5' 6\")   Wt 121 kg (266 lb 12.8 oz)   LMP 06/16/2024 (Exact Date)   SpO2 97%   BMI 43.06 kg/m     Estimated body mass index is 43.06 kg/m  as calculated from the following:    Height as of this encounter: 1.676 m (5' 6\").    Weight as of this encounter: 121 kg (266 lb 12.8 oz).    Physical Exam  GENERAL: alert and no distress  EYES: Eyes grossly normal to inspection, and conjunctivae and sclerae normal  HENT: ear canals and TM's normal, and mouth without ulcers or lesions  NECK: no adenopathy, no asymmetry, masses, or scars  RESP: lungs clear to auscultation - no rales, rhonchi or wheezes  CV: regular rate and rhythm, normal S1 S2, no S3 or S4, no murmur, click or rub, no peripheral edema  ABDOMEN: soft, nontender, no masses and bowel sounds normal   (female): normal female external genitalia, normal urethral meatus, white, watery discharge  MS: no gross musculoskeletal defects noted, no edema  SKIN: no suspicious lesions or rashes  NEURO: Normal strength and tone, mentation intact and speech normal  PSYCH: mentation appears normal, affect normal/bright    Signed Electronically by: CRISTAL Castaneda CNP    "

## 2024-07-11 NOTE — Clinical Note
Please try and obtain records - pt reports abnormal pap when she was in her early 20's at [facility]. No records seen

## 2024-07-11 NOTE — PATIENT INSTRUCTIONS
Patient Education   Preventive Care Advice   This is general advice given by our system to help you stay healthy. However, your care team may have specific advice just for you. Please talk to your care team about your preventive care needs.  Nutrition  Eat 5 or more servings of fruits and vegetables each day.  Try wheat bread, brown rice and whole grain pasta (instead of white bread, rice, and pasta).  Get enough calcium and vitamin D. Check the label on foods and aim for 100% of the RDA (recommended daily allowance).  Lifestyle  Exercise at least 150 minutes each week  (30 minutes a day, 5 days a week).  Do muscle strengthening activities 2 days a week. These help control your weight and prevent disease.  No smoking.  Wear sunscreen to prevent skin cancer.  Have a dental exam and cleaning every 6 months.  Yearly exams  See your health care team every year to talk about:  Any changes in your health.  Any medicines your care team has prescribed.  Preventive care, family planning, and ways to prevent chronic diseases.  Shots (vaccines)   HPV shots (up to age 26), if you've never had them before.  Hepatitis B shots (up to age 59), if you've never had them before.  COVID-19 shot: Get this shot when it's due.  Flu shot: Get a flu shot every year.  Tetanus shot: Get a tetanus shot every 10 years.  Pneumococcal, hepatitis A, and RSV shots: Ask your care team if you need these based on your risk.  Shingles shot (for age 50 and up)  General health tests  Diabetes screening:  Starting at age 35, Get screened for diabetes at least every 3 years.  If you are younger than age 35, ask your care team if you should be screened for diabetes.  Cholesterol test: At age 39, start having a cholesterol test every 5 years, or more often if advised.  Bone density scan (DEXA): At age 50, ask your care team if you should have this scan for osteoporosis (brittle bones).  Hepatitis C: Get tested at least once in your life.  STIs (sexually  transmitted infections)  Before age 24: Ask your care team if you should be screened for STIs.  After age 24: Get screened for STIs if you're at risk. You are at risk for STIs (including HIV) if:  You are sexually active with more than one person.  You don't use condoms every time.  You or a partner was diagnosed with a sexually transmitted infection.  If you are at risk for HIV, ask about PrEP medicine to prevent HIV.  Get tested for HIV at least once in your life, whether you are at risk for HIV or not.  Cancer screening tests  Cervical cancer screening: If you have a cervix, begin getting regular cervical cancer screening tests starting at age 21.  Breast cancer scan (mammogram): If you've ever had breasts, begin having regular mammograms starting at age 40. This is a scan to check for breast cancer.  Colon cancer screening: It is important to start screening for colon cancer at age 45.  Have a colonoscopy test every 10 years (or more often if you're at risk) Or, ask your provider about stool tests like a FIT test every year or Cologuard test every 3 years.  To learn more about your testing options, visit:   .  For help making a decision, visit:   https://bit.ly/xo96536.  Prostate cancer screening test: If you have a prostate, ask your care team if a prostate cancer screening test (PSA) at age 55 is right for you.  Lung cancer screening: If you are a current or former smoker ages 50 to 80, ask your care team if ongoing lung cancer screenings are right for you.  For informational purposes only. Not to replace the advice of your health care provider. Copyright   2023 [facility]. All rights reserved. Clinically reviewed by the [facility]. ScaleDB 593381 - REV 01/24.  Eating Healthy Foods: Care Instructions  With every meal, you can make healthy food choices. Try to eat a variety of fruits, vegetables, whole grains, lean proteins, and low-fat dairy products. This can help  "you get the right balance of nutrients, including vitamins and minerals. Small changes add up over time. You can start by adding one healthy food to your meals each day.    Try to make half your plate fruits and vegetables, one-fourth whole grains, and one-fourth lean proteins. Try including dairy with your meals.   Eat more fruits and vegetables. Try to have them with most meals and snacks.   Foods for healthy eating    Fruits    These can be fresh, frozen, canned, or dried.  Try to choose whole fruit rather than fruit juice.  Eat a variety of colors.    Vegetables    These can be fresh, frozen, canned, or dried.  Beans, peas, and lentils count too.    Whole grains    Choose whole-grain breads, cereals, and noodles.  Try brown rice.    Lean proteins    These can include lean meat, poultry, fish, and eggs.  You can also have tofu, beans, peas, lentils, nuts, and seeds.    Dairy    Try milk, yogurt, and cheese.  Choose low-fat or fat-free when you can.  If you need to, use lactose-free milk or fortified plant-based milk products, such as soy milk.    Water    Drink water when you're thirsty.  Limit sugar-sweetened drinks, including soda, fruit drinks, and sports drinks.  Where can you learn more?  Go to https://www.Tail.net/patiented  Enter T756 in the search box to learn more about \"Eating Healthy Foods: Care Instructions.\"  Current as of: September 20, 2023               Content Version: 14.0    3909-8053 [employer].   Care instructions adapted under license by your healthcare professional. If you have questions about a medical condition or this instruction, always ask your healthcare professional. [employer] disclaims any warranty or liability for your use of this information.      Learning About Stress  What is stress?     Stress is your body's response to a hard situation. Your body can have a physical, emotional, or mental response. Stress is a fact of life for most people, and " it affects everyone differently. What causes stress for you may not be stressful for someone else.  A lot of things can cause stress. You may feel stress when you go on a job interview, take a test, or run a race. This kind of short-term stress is normal and even useful. It can help you if you need to work hard or react quickly. For example, stress can help you finish an important job on time.  Long-term stress is caused by ongoing stressful situations or events. Examples of long-term stress include long-term health problems, ongoing problems at work, or conflicts in your family. Long-term stress can harm your health.  How does stress affect your health?  When you are stressed, your body responds as though you are in danger. It makes hormones that speed up your heart, make you breathe faster, and give you a burst of energy. This is called the fight-or-flight stress response. If the stress is over quickly, your body goes back to normal and no harm is done.  But if stress happens too often or lasts too long, it can have bad effects. Long-term stress can make you more likely to get sick, and it can make symptoms of some diseases worse. If you tense up when you are stressed, you may develop neck, shoulder, or low back pain. Stress is linked to high blood pressure and heart disease.  Stress also harms your emotional health. It can make you lane, tense, or depressed. Your relationships may suffer, and you may not do well at work or school.  What can you do to manage stress?  You can try these things to help manage stress:   Do something active. Exercise or activity can help reduce stress. Walking is a great way to get started. Even everyday activities such as housecleaning or yard work can help.  Try yoga or denae chi. These techniques combine exercise and meditation. You may need some training at first to learn them.  Do something you enjoy. For example, listen to music or go to a movie. Practice your hobby or do  "volunteer work.  Meditate. This can help you relax, because you are not worrying about what happened before or what may happen in the future.  Do guided imagery. Imagine yourself in any setting that helps you feel calm. You can use online videos, books, or a teacher to guide you.  Do breathing exercises. For example:  From a standing position, bend forward from the waist with your knees slightly bent. Let your arms dangle close to the floor.  Breathe in slowly and deeply as you return to a standing position. Roll up slowly and lift your head last.  Hold your breath for just a few seconds in the standing position.  Breathe out slowly and bend forward from the waist.  Let your feelings out. Talk, laugh, cry, and express anger when you need to. Talking with supportive friends or family, a counselor, or a robbie leader about your feelings is a healthy way to relieve stress. Avoid discussing your feelings with people who make you feel worse.  Write. It may help to write about things that are bothering you. This helps you find out how much stress you feel and what is causing it. When you know this, you can find better ways to cope.  What can you do to prevent stress?  You might try some of these things to help prevent stress:  Manage your time. This helps you find time to do the things you want and need to do.  Get enough sleep. Your body recovers from the stresses of the day while you are sleeping.  Get support. Your family, friends, and community can make a difference in how you experience stress.  Limit your news feed. Avoid or limit time on social media or news that may make you feel stressed.  Do something active. Exercise or activity can help reduce stress. Walking is a great way to get started.  Where can you learn more?  Go to https://www.healthwise.net/patiented  Enter N032 in the search box to learn more about \"Learning About Stress.\"  Current as of: October 24, 2023               Content Version: 14.0    " 3283-7884 [employer].   Care instructions adapted under license by your healthcare professional. If you have questions about a medical condition or this instruction, always ask your healthcare professional. [employer] disclaims any warranty or liability for your use of this information.

## 2024-07-11 NOTE — TELEPHONE ENCOUNTER
[facility] placed at . Patient was called and ask to come in to sign it so we can get records and update chart as they are not in care everywhere. Patient agreed to come in a sign when she has some time. Nuha Diop MA

## 2024-07-12 ENCOUNTER — MYC MEDICAL ADVICE (OUTPATIENT)
Dept: FAMILY MEDICINE | Facility: CLINIC | Age: 39
End: 2024-07-12
Payer: COMMERCIAL

## 2024-07-12 LAB
HPV HR 12 DNA CVX QL NAA+PROBE: NEGATIVE
HPV16 DNA CVX QL NAA+PROBE: NEGATIVE
HPV18 DNA CVX QL NAA+PROBE: NEGATIVE
HUMAN PAPILLOMA VIRUS FINAL DIAGNOSIS: NORMAL

## 2024-07-16 PROBLEM — Z87.42 HISTORY OF ABNORMAL CERVICAL PAP SMEAR: Status: ACTIVE | Noted: 2024-07-16

## 2024-07-16 LAB
BKR LAB AP GYN ADEQUACY: NORMAL
BKR LAB AP GYN INTERPRETATION: NORMAL
BKR LAB AP PREVIOUS ABNORMAL: NORMAL
PATH REPORT.COMMENTS IMP SPEC: NORMAL
PATH REPORT.COMMENTS IMP SPEC: NORMAL
PATH REPORT.RELEVANT HX SPEC: NORMAL

## 2024-09-10 ENCOUNTER — MYC MEDICAL ADVICE (OUTPATIENT)
Dept: FAMILY MEDICINE | Facility: CLINIC | Age: 39
End: 2024-09-10
Payer: COMMERCIAL

## 2024-09-11 ENCOUNTER — OFFICE VISIT (OUTPATIENT)
Dept: URGENT CARE | Facility: URGENT CARE | Age: 39
End: 2024-09-11
Payer: COMMERCIAL

## 2024-09-11 VITALS
HEART RATE: 80 BPM | SYSTOLIC BLOOD PRESSURE: 112 MMHG | WEIGHT: 266 LBS | RESPIRATION RATE: 22 BRPM | BODY MASS INDEX: 42.93 KG/M2 | TEMPERATURE: 98.2 F | OXYGEN SATURATION: 98 % | DIASTOLIC BLOOD PRESSURE: 78 MMHG

## 2024-09-11 DIAGNOSIS — S90.851A FOREIGN BODY OF SKIN OF PLANTAR ASPECT OF RIGHT FOOT: Primary | ICD-10-CM

## 2024-09-11 PROCEDURE — 99207 PR NO CHARGE LOS: CPT | Performed by: NURSE PRACTITIONER

## 2024-09-11 PROCEDURE — 10120 INC&RMVL FB SUBQ TISS SMPL: CPT | Performed by: NURSE PRACTITIONER

## 2024-09-11 ASSESSMENT — ENCOUNTER SYMPTOMS
CONSTITUTIONAL NEGATIVE: 1
NEUROLOGICAL NEGATIVE: 1

## 2024-09-11 NOTE — PATIENT INSTRUCTIONS
Keep site clean and covered. Wash 2-3 times daily with clean running water. Elevate above the level of your heart throughout the day.     If foreign body persists, site does not improve, or site worsens follow up.

## 2024-09-11 NOTE — PROGRESS NOTES
Assessment & Plan       ICD-10-CM    1. Foreign body of skin of plantar aspect of right foot  S90.851A            MDM: A week ago patient was around broken glass and thinks a piece might be stuck in the plantar right foot below the level of the fourth toe. No signs of infection. But able to see track and feel small hard area on foot.     Patient Instructions   Keep site clean and covered. Wash 2-3 times daily with clean running water. Elevate above the level of your heart throughout the day.     Follow up with primary care provider with any problems, questions or concerns or if symptoms worsen or fail to improve. Patient agreed to plan and verbalized understanding.     Procedure:  Foreign body removal from plantar right foot below the level of the fourth toe. Site cleansed and injected with 1.5 ml of 1% Lido with Epi. Patient tolerated well. Gave time for medication to work. Site was again cleansed with Betadine and when adequately numb at site utilized an 11 blade scalpel to cut the skin in an X over the track. Fine tweezers utilized to remove 2 very small flecks of glass. Pressure was held on the site before applying a thin layer of Bacitracin and a band aid. Minimal blood loss. Patient tolerated procedure well.    Pooja Grove is a 39 year old female who presents to clinic today for the following health issues:  Chief Complaint   Patient presents with    Urgent Care     X 1 year ago, there was broken glass in laundry room, took glass out of feet. A few days there was broken glass in bathroom and she stepped on it, right foot, red swollen area, feels hard.      Although possibly stepped on glass a year ago, she may have stepped on broken glass one week ago. Tender to walk on. No fevers or other systemic symptoms.            Review of Systems   Constitutional: Negative.    HENT: Negative.     Musculoskeletal:         Patient has foreign body sensation in right plantar foot for the last week.    Neurological: Negative.        Problem List:  2024-09: Foreign body of skin of plantar aspect of right foot  2024-07: History of abnormal cervical Pap smear  2024-01: Jumper's knee of right side  2024-01: Tennis elbow  2024-01: Patellar tendonitis  2016-01: Acne vulgaris  2016-01: Major depressive disorder, recurrent episode, mild (H24)  2016-01: Menstrual migraine without status migrainosus, not   intractable      Past Medical History:   Diagnosis Date    Anxiety     Depressive disorder 2012    Seasonal allergic rhinitis     Uncomplicated asthma 2013         Social History     Tobacco Use    Smoking status: Former     Types: Cigarettes    Smokeless tobacco: Never    Tobacco comments:     A few cigarettes on the weekends for about 5 years, stopped at age 23   Substance Use Topics    Alcohol use: Yes     Comment: 2 drinks/week           Objective    /78   Pulse 80   Temp 98.2  F (36.8  C)   Resp 22   Wt 120.7 kg (266 lb)   SpO2 98%   BMI 42.93 kg/m    Physical Exam  Constitutional:       Appearance: Normal appearance.   HENT:      Head: Normocephalic and atraumatic.   Neurological:      Mental Status: She is alert.      Right foot: Small tract with 3 mm raised area on right plantar foot. No erythema or fluctuance noted. No other signs of infection. Mildly tender to palpation.        Shanique Saldana NP

## 2024-11-11 ENCOUNTER — APPOINTMENT (OUTPATIENT)
Dept: CT IMAGING | Facility: CLINIC | Age: 39
End: 2024-11-11
Attending: EMERGENCY MEDICINE
Payer: COMMERCIAL

## 2024-11-11 ENCOUNTER — HOSPITAL ENCOUNTER (EMERGENCY)
Facility: CLINIC | Age: 39
Discharge: HOME | End: 2024-11-12
Attending: EMERGENCY MEDICINE | Admitting: EMERGENCY MEDICINE
Payer: COMMERCIAL

## 2024-11-11 ENCOUNTER — NURSE TRIAGE (OUTPATIENT)
Dept: NURSING | Facility: CLINIC | Age: 39
End: 2024-11-11
Payer: COMMERCIAL

## 2024-11-11 VITALS
DIASTOLIC BLOOD PRESSURE: 92 MMHG | BODY MASS INDEX: 44.04 KG/M2 | HEART RATE: 90 BPM | WEIGHT: 274.03 LBS | OXYGEN SATURATION: 99 % | SYSTOLIC BLOOD PRESSURE: 144 MMHG | HEIGHT: 66 IN | TEMPERATURE: 97.2 F | RESPIRATION RATE: 18 BRPM

## 2024-11-11 DIAGNOSIS — R93.5 ABNORMAL CT OF THE ABDOMEN: ICD-10-CM

## 2024-11-11 DIAGNOSIS — K80.50 BILIARY COLIC: ICD-10-CM

## 2024-11-11 LAB
ALBUMIN SERPL BCG-MCNC: 3.9 G/DL (ref 3.5–5.2)
ALP SERPL-CCNC: 66 U/L (ref 40–150)
ALT SERPL W P-5'-P-CCNC: 15 U/L (ref 0–50)
ANION GAP SERPL CALCULATED.3IONS-SCNC: 14 MMOL/L (ref 7–15)
AST SERPL W P-5'-P-CCNC: 16 U/L (ref 0–45)
BASOPHILS # BLD AUTO: 0.1 10E3/UL (ref 0–0.2)
BASOPHILS NFR BLD AUTO: 1 %
BILIRUB DIRECT SERPL-MCNC: <0.2 MG/DL (ref 0–0.3)
BILIRUB SERPL-MCNC: 0.2 MG/DL
BUN SERPL-MCNC: 11.1 MG/DL (ref 6–20)
CALCIUM SERPL-MCNC: 8.7 MG/DL (ref 8.8–10.4)
CHLORIDE SERPL-SCNC: 103 MMOL/L (ref 98–107)
CREAT SERPL-MCNC: 0.76 MG/DL (ref 0.51–0.95)
D DIMER PPP FEU-MCNC: 0.73 UG/ML FEU (ref 0–0.5)
EGFRCR SERPLBLD CKD-EPI 2021: >90 ML/MIN/1.73M2
EOSINOPHIL # BLD AUTO: 0.2 10E3/UL (ref 0–0.7)
EOSINOPHIL NFR BLD AUTO: 2 %
ERYTHROCYTE [DISTWIDTH] IN BLOOD BY AUTOMATED COUNT: 12.7 % (ref 10–15)
FLUAV RNA SPEC QL NAA+PROBE: NEGATIVE
FLUBV RNA RESP QL NAA+PROBE: NEGATIVE
GLUCOSE SERPL-MCNC: 138 MG/DL (ref 70–99)
HCG SERPL QL: NEGATIVE
HCO3 SERPL-SCNC: 21 MMOL/L (ref 22–29)
HCT VFR BLD AUTO: 41.5 % (ref 35–47)
HGB BLD-MCNC: 13.6 G/DL (ref 11.7–15.7)
HOLD SPECIMEN: NORMAL
HOLD SPECIMEN: NORMAL
IMM GRANULOCYTES # BLD: 0 10E3/UL
IMM GRANULOCYTES NFR BLD: 0 %
LIPASE SERPL-CCNC: 23 U/L (ref 13–60)
LYMPHOCYTES # BLD AUTO: 3.5 10E3/UL (ref 0.8–5.3)
LYMPHOCYTES NFR BLD AUTO: 33 %
MCH RBC QN AUTO: 28.2 PG (ref 26.5–33)
MCHC RBC AUTO-ENTMCNC: 32.8 G/DL (ref 31.5–36.5)
MCV RBC AUTO: 86 FL (ref 78–100)
MONOCYTES # BLD AUTO: 0.8 10E3/UL (ref 0–1.3)
MONOCYTES NFR BLD AUTO: 7 %
NEUTROPHILS # BLD AUTO: 5.9 10E3/UL (ref 1.6–8.3)
NEUTROPHILS NFR BLD AUTO: 57 %
NRBC # BLD AUTO: 0 10E3/UL
NRBC BLD AUTO-RTO: 0 /100
PLATELET # BLD AUTO: 372 10E3/UL (ref 150–450)
POTASSIUM SERPL-SCNC: 4 MMOL/L (ref 3.4–5.3)
PROT SERPL-MCNC: 6.6 G/DL (ref 6.4–8.3)
RBC # BLD AUTO: 4.83 10E6/UL (ref 3.8–5.2)
RSV RNA SPEC NAA+PROBE: NEGATIVE
SARS-COV-2 RNA RESP QL NAA+PROBE: NEGATIVE
SODIUM SERPL-SCNC: 138 MMOL/L (ref 135–145)
WBC # BLD AUTO: 10.5 10E3/UL (ref 4–11)

## 2024-11-11 PROCEDURE — 250N000011 HC RX IP 250 OP 636: Performed by: EMERGENCY MEDICINE

## 2024-11-11 PROCEDURE — 99285 EMERGENCY DEPT VISIT HI MDM: CPT | Mod: 25

## 2024-11-11 PROCEDURE — 80048 BASIC METABOLIC PNL TOTAL CA: CPT | Performed by: EMERGENCY MEDICINE

## 2024-11-11 PROCEDURE — 250N000009 HC RX 250: Performed by: EMERGENCY MEDICINE

## 2024-11-11 PROCEDURE — 74177 CT ABD & PELVIS W/CONTRAST: CPT

## 2024-11-11 PROCEDURE — 85025 COMPLETE CBC W/AUTO DIFF WBC: CPT | Performed by: EMERGENCY MEDICINE

## 2024-11-11 PROCEDURE — 93005 ELECTROCARDIOGRAM TRACING: CPT

## 2024-11-11 PROCEDURE — 87637 SARSCOV2&INF A&B&RSV AMP PRB: CPT | Performed by: EMERGENCY MEDICINE

## 2024-11-11 PROCEDURE — 84703 CHORIONIC GONADOTROPIN ASSAY: CPT | Performed by: EMERGENCY MEDICINE

## 2024-11-11 PROCEDURE — 85379 FIBRIN DEGRADATION QUANT: CPT | Performed by: EMERGENCY MEDICINE

## 2024-11-11 PROCEDURE — 36415 COLL VENOUS BLD VENIPUNCTURE: CPT | Performed by: EMERGENCY MEDICINE

## 2024-11-11 PROCEDURE — 82947 ASSAY GLUCOSE BLOOD QUANT: CPT | Performed by: EMERGENCY MEDICINE

## 2024-11-11 PROCEDURE — 83690 ASSAY OF LIPASE: CPT | Performed by: EMERGENCY MEDICINE

## 2024-11-11 PROCEDURE — 82248 BILIRUBIN DIRECT: CPT | Performed by: EMERGENCY MEDICINE

## 2024-11-11 RX ORDER — IOPAMIDOL 755 MG/ML
120 INJECTION, SOLUTION INTRAVASCULAR ONCE
Status: COMPLETED | OUTPATIENT
Start: 2024-11-11 | End: 2024-11-11

## 2024-11-11 RX ADMIN — SODIUM CHLORIDE 90 ML: 9 INJECTION, SOLUTION INTRAVENOUS at 22:17

## 2024-11-11 RX ADMIN — IOPAMIDOL 100 ML: 755 INJECTION, SOLUTION INTRAVENOUS at 22:17

## 2024-11-11 ASSESSMENT — COLUMBIA-SUICIDE SEVERITY RATING SCALE - C-SSRS
1. IN THE PAST MONTH, HAVE YOU WISHED YOU WERE DEAD OR WISHED YOU COULD GO TO SLEEP AND NOT WAKE UP?: NO
2. HAVE YOU ACTUALLY HAD ANY THOUGHTS OF KILLING YOURSELF IN THE PAST MONTH?: NO
6. HAVE YOU EVER DONE ANYTHING, STARTED TO DO ANYTHING, OR PREPARED TO DO ANYTHING TO END YOUR LIFE?: NO

## 2024-11-11 ASSESSMENT — ACTIVITIES OF DAILY LIVING (ADL)
ADLS_ACUITY_SCORE: 0

## 2024-11-11 NOTE — Clinical Note
Maine Choe was seen and treated in our emergency department on 11/11/2024.  She may return to work on 11/13/2024.       If you have any questions or concerns, please don't hesitate to call.      Stacie Camacho, DO

## 2024-11-12 LAB
ATRIAL RATE - MUSE: 75 BPM
DIASTOLIC BLOOD PRESSURE - MUSE: NORMAL MMHG
INTERPRETATION ECG - MUSE: NORMAL
P AXIS - MUSE: 52 DEGREES
PR INTERVAL - MUSE: 160 MS
QRS DURATION - MUSE: 112 MS
QT - MUSE: 384 MS
QTC - MUSE: 428 MS
R AXIS - MUSE: 59 DEGREES
SYSTOLIC BLOOD PRESSURE - MUSE: NORMAL MMHG
T AXIS - MUSE: 30 DEGREES
VENTRICULAR RATE- MUSE: 75 BPM

## 2024-11-12 RX ORDER — ONDANSETRON 4 MG/1
4 TABLET, ORALLY DISINTEGRATING ORAL EVERY 8 HOURS PRN
Qty: 10 TABLET | Refills: 0 | Status: SHIPPED | OUTPATIENT
Start: 2024-11-12 | End: 2024-11-15

## 2024-11-12 RX ORDER — OXYCODONE HYDROCHLORIDE 5 MG/1
5 TABLET ORAL EVERY 6 HOURS PRN
Qty: 8 TABLET | Refills: 0 | Status: SHIPPED | OUTPATIENT
Start: 2024-11-12 | End: 2024-11-15

## 2024-11-12 NOTE — TELEPHONE ENCOUNTER
"Nurse Triage SBAR    Is this a 2nd Level Triage? NO    Situation: Pt calling with upper abdominal pain, upper back pain, heaviness in her chest and \"can not take a deep breath\"    Background: Pt is 39 with an active order for Spironolactone on her medication list for Acne    Assessment:   Abdominal pain under breast and upper back pain since Saturday  Denies chest pain, but \"can not take a deep breath\" and has chest heaviness    Tried a hot pad for the pain but it is getting worse; not better. Pain rated 4-5/10  Gas ex not effective, but no gas     Protocol Recommended Disposition:   Go to ED Now    Recommendation: ED. Pt is going there now.      Reason for Disposition   [1] Pain lasts > 10 minutes AND [2] age > 40 AND [3] associated chest, arm, neck, upper back or jaw pain    Additional Information   Negative: SEVERE difficulty breathing (e.g., struggling for each breath, speaks in single words)   Negative: Chest pain   Negative: Shock suspected (e.g., cold/pale/clammy skin, too weak to stand, low BP, rapid pulse)   Negative: Difficult to awaken or acting confused (e.g., disoriented, slurred speech)   Negative: Passed out (i.e., lost consciousness, collapsed and was not responding)   Negative: Sounds like a life-threatening emergency to the triager   Negative: Visible sweat on face or sweat dripping down face   Negative: Followed an abdomen (stomach) injury   Negative: [1] Abdominal pain AND [2] pregnant < 20 weeks   Negative: [1] Abdominal pain AND [2] pregnant 20 or more weeks   Negative: [1] Abdominal pain AND [2] postpartum (from 0 to 6 weeks after delivery)   Negative: [1] SEVERE pain (e.g., excruciating) AND [2] present > 1 hour   Negative: [1] Pain lasts > 10 minutes AND [2] age > 50    Protocols used: Abdominal Pain - Upper-A-  Kecia Menon RN   Triage Nurse Advisor on 11/11/2024 at 7:32 PM  "

## 2024-11-12 NOTE — ED TRIAGE NOTES
Pt presents to triage with c/o abdominal pain since Saturday that radiates into back. Denies N/V/D. Took Gas X at  home, not effective. Decreased appetite today, taking oral fluids fine. Intermittent sharp pain

## 2024-11-12 NOTE — DISCHARGE INSTRUCTIONS
Portal vein thrombosis identified on imaging. Please followup with PCP. Monitor for fever, increasing abdominal pain, persistent vomiting. Strong suspicion gallstones are cause of pain today.

## 2024-11-12 NOTE — ED PROVIDER NOTES
"  Emergency Department Note      History of Present Illness     Chief Complaint   Abdominal Pain      HPI   Maine Choe is a 39 year old female with a history of anxiety, depression and asthma who presents for evaluation of upper abdominal pain. Her pain started on Friday (11/08) and she has been having episodic pain that worsened with deep breathing. She denies fever, chills, cough, chest pain, shortness of breath, nausea, vomiting, diarrhea, dysuria or black or bloody stools. She denies past history of any abdominal surgery or gallbladder problems. She also denies any history of alcohol intake or any recent sick contacts. She was using ibuprofen a lot but it was 6 months ago.  She is on birth control. No blood clot history.     Independent Historian   Boyfriend as detailed above.    Review of External Notes   9/11/24 office visit    Past Medical History     Medical History and Problem List   Anxiety   Depression  Seasonal allergic rhinitis   Asthma     Medications   Albuterol inhaler  Escitalopram   Spironolactone  Sumatriptan      Surgical History   Carpal tunnel release surgery   Tonsillectomy   Physical Exam     Patient Vitals for the past 24 hrs:   BP Temp Pulse Resp SpO2 Height Weight   11/11/24 1951 (!) 144/92 97.2  F (36.2  C) 90 18 99 % 1.676 m (5' 6\") 124.3 kg (274 lb 0.5 oz)     Physical Exam  Nursing note and vitals reviewed.  Constitutional: Well nourished.   Eyes: Conjunctiva normal.  Pupils are equal, round, and reactive to light.   ENT: Nose normal. Mucous membranes pink and moist.    Neck: Normal range of motion.  CVS: Normal rate, regular rhythm.  Normal heart sounds.   Pulmonary: Lungs clear to auscultation bilaterally. No wheezes/rales/rhonchi.  GI: Obese. Abdomen soft. Mild upper abdominal tenderness. No rigidity or guarding.  No CVA tenderness.   MSK: No calf tenderness or swelling.  Neuro: Alert. Follows simple commands.  Skin: Skin is warm and dry. No rash noted.   Psychiatric: Normal " affect.       Diagnostics     Lab Results   Labs Ordered and Resulted from Time of ED Arrival to Time of ED Departure   BASIC METABOLIC PANEL - Abnormal       Result Value    Sodium 138      Potassium 4.0      Chloride 103      Carbon Dioxide (CO2) 21 (*)     Anion Gap 14      Urea Nitrogen 11.1      Creatinine 0.76      GFR Estimate >90      Calcium 8.7 (*)     Glucose 138 (*)    D DIMER QUANTITATIVE - Abnormal    D-Dimer Quantitative 0.73 (*)    HEPATIC FUNCTION PANEL - Normal    Protein Total 6.6      Albumin 3.9      Bilirubin Total 0.2      Alkaline Phosphatase 66      AST 16      ALT 15      Bilirubin Direct <0.20     LIPASE - Normal    Lipase 23     HCG QUALITATIVE PREGNANCY - Normal    hCG Serum Qualitative Negative     INFLUENZA A/B, RSV, & SARS-COV2 PCR - Normal    Influenza A PCR Negative      Influenza B PCR Negative      RSV PCR Negative      SARS CoV2 PCR Negative     CBC WITH PLATELETS AND DIFFERENTIAL    WBC Count 10.5      RBC Count 4.83      Hemoglobin 13.6      Hematocrit 41.5      MCV 86      MCH 28.2      MCHC 32.8      RDW 12.7      Platelet Count 372      % Neutrophils 57      % Lymphocytes 33      % Monocytes 7      % Eosinophils 2      % Basophils 1      % Immature Granulocytes 0      NRBCs per 100 WBC 0      Absolute Neutrophils 5.9      Absolute Lymphocytes 3.5      Absolute Monocytes 0.8      Absolute Eosinophils 0.2      Absolute Basophils 0.1      Absolute Immature Granulocytes 0.0      Absolute NRBCs 0.0         Imaging   CT Chest (PE) Abdomen Pelvis w Contrast   Final Result   IMPRESSION:   1.  Thrombosis of the right posterior portal vein of uncertain etiology. Further evaluation by MR with gadolinium suggested.   2.  Cholelithiasis.   3.  No pulmonary embolus or other acute process in the chest.          EKG   ECG results from 11/11/24   EKG 12-lead, tracing only     Value    Systolic Blood Pressure     Diastolic Blood Pressure     Ventricular Rate 75    Atrial Rate 75    TX  Interval 160    QRS Duration 112        QTc 428    P Axis 52    R AXIS 59    T Axis 30    Interpretation ECG      Sinus rhythm  Incomplete right bundle branch block  Borderline ECG  Interepreted by me by me at 2154        Independent Interpretation   None    ED Course      Medications Administered   Medications - No data to display    Procedures   Procedures     Discussion of Management   None    ED Course   ED Course as of 11/11/24 2049 Mon Nov 11, 2024 2048 I obtained the history and examined the patient as above.        Additional Documentation  None    Medical Decision Making / Diagnosis     CMS Diagnoses: None    MIPS       D-dimer elevated so CT ordered to exclude PE    MDM   Maine Choe is a 39 year old female presenting with predominately abdominal pain that has been intermittent over the past few days.  She also admitted to slight dyspnea.  She is nontoxic, well-hydrated, in no significant distress on arrival.  EKG without focal ischemia or underlying arrhythmia.  She denies any active chest pain and I doubt ACS.  Screening D-dimer ordered given history of birth control and elevated so she did undergo formal CT which is fortunately without evidence of PE, pneumonia, pneumothorax or acute process in the chest.  CT abdomen with evidence to suggest cholelithiasis though no cholecystitis.  Incidental thrombosis of the right posterior portal vein identified which is of uncertain etiology.  I did recommend close outpatient follow-up regarding this.  Labs overall reassuring without profound anemia or significant electrolyte derangement.  She is not pregnant.  Strong suspicion presentation is more secondary to biliary colic.  She reported symptom improvement during her time in the ED.  I will provide oxycodone for breakthrough pain as well as antiemetics.  Dietary recommendations discussed.  General surgery referral provided on discharge as well should she develop recurrent episodes.  She plans to  follow-up closely with PCP as well regarding abnormal CT findings regarding portal vein.  Monitor for fever, increasing abdominal pain, protracted vomiting or should symptoms worsen or change to promptly represent.  All questions addressed.      Disposition   The patient was discharged.     Diagnosis     ICD-10-CM    1. Biliary colic  K80.50       2. Abnormal CT of the abdomen  R93.5     R. portal vein thrombosis           Discharge Medications   Discharge Medication List as of 11/12/2024 12:14 AM        START taking these medications    Details   ondansetron (ZOFRAN ODT) 4 MG ODT tab Take 1 tablet (4 mg) by mouth every 8 hours as needed for nausea., Disp-10 tablet, R-0, E-Prescribe      oxyCODONE (ROXICODONE) 5 MG tablet Take 1 tablet (5 mg) by mouth every 6 hours as needed for breakthrough pain., Disp-8 tablet, R-0, E-Prescribe           Scribe Disclosure:  Shawna SALINAS, am serving as a scribe at 8:28 PM on 11/11/2024 to document services personally performed by Stacie Camacho DO based on my observations and the provider's statements to me.          Stacie Camacho DO  11/12/24 0027

## 2024-11-13 ENCOUNTER — OFFICE VISIT (OUTPATIENT)
Dept: SURGERY | Facility: CLINIC | Age: 39
End: 2024-11-13
Payer: COMMERCIAL

## 2024-11-13 ENCOUNTER — OFFICE VISIT (OUTPATIENT)
Dept: FAMILY MEDICINE | Facility: CLINIC | Age: 39
End: 2024-11-13
Payer: COMMERCIAL

## 2024-11-13 ENCOUNTER — TELEPHONE (OUTPATIENT)
Dept: SURGERY | Facility: CLINIC | Age: 39
End: 2024-11-13

## 2024-11-13 VITALS
DIASTOLIC BLOOD PRESSURE: 74 MMHG | SYSTOLIC BLOOD PRESSURE: 106 MMHG | WEIGHT: 268.9 LBS | TEMPERATURE: 98.8 F | BODY MASS INDEX: 43.21 KG/M2 | RESPIRATION RATE: 25 BRPM | HEART RATE: 78 BPM | OXYGEN SATURATION: 98 % | HEIGHT: 66 IN

## 2024-11-13 VITALS
HEART RATE: 81 BPM | SYSTOLIC BLOOD PRESSURE: 108 MMHG | RESPIRATION RATE: 16 BRPM | DIASTOLIC BLOOD PRESSURE: 82 MMHG | WEIGHT: 266 LBS | BODY MASS INDEX: 42.93 KG/M2 | OXYGEN SATURATION: 96 %

## 2024-11-13 DIAGNOSIS — K80.20 GALLSTONES: ICD-10-CM

## 2024-11-13 DIAGNOSIS — I81 PORTAL VEIN THROMBOSIS: ICD-10-CM

## 2024-11-13 DIAGNOSIS — K80.20 SYMPTOMATIC CHOLELITHIASIS: Primary | ICD-10-CM

## 2024-11-13 DIAGNOSIS — Z23 ENCOUNTER FOR IMMUNIZATION: ICD-10-CM

## 2024-11-13 DIAGNOSIS — R93.2 ABNORMAL LIVER CT: Primary | ICD-10-CM

## 2024-11-13 DIAGNOSIS — E78.1 HYPERTRIGLYCERIDEMIA: ICD-10-CM

## 2024-11-13 PROCEDURE — 90471 IMMUNIZATION ADMIN: CPT

## 2024-11-13 PROCEDURE — 99204 OFFICE O/P NEW MOD 45 MIN: CPT | Performed by: STUDENT IN AN ORGANIZED HEALTH CARE EDUCATION/TRAINING PROGRAM

## 2024-11-13 PROCEDURE — 90480 ADMN SARSCOV2 VAC 1/ONLY CMP: CPT

## 2024-11-13 PROCEDURE — 90656 IIV3 VACC NO PRSV 0.5 ML IM: CPT

## 2024-11-13 PROCEDURE — 99214 OFFICE O/P EST MOD 30 MIN: CPT | Mod: 25

## 2024-11-13 PROCEDURE — 91320 SARSCV2 VAC 30MCG TRS-SUC IM: CPT

## 2024-11-13 RX ORDER — INDOCYANINE GREEN AND WATER 25 MG
1.5 KIT INJECTION ONCE
OUTPATIENT
Start: 2024-11-13 | End: 2024-11-13

## 2024-11-13 ASSESSMENT — PAIN SCALES - GENERAL: PAINLEVEL_OUTOF10: MODERATE PAIN (4)

## 2024-11-13 NOTE — TELEPHONE ENCOUNTER
Type of surgery: ROBOTIC ASSISTED LAPAROSCOPIC CHOLECYSTECTOMY   Location of surgery: [facility]  Date and time of surgery: 1-15-25, 9:45 AM  Surgeon: DR BELL  Pre-Op Appt Date: PATIENT TO SCHEDULE   Post-Op Appt Date: NA   Packet sent out: Yes  Pre-cert/Authorization completed:  Not Applicable  Date: 11-13-24        ROBOTIC ASSISTED LAPAROSCOPIC CHOLECYSTECTOMY GENERAL PT INST TO HAVE H&P 75 MINS REQ PA ASSIST RTC ALW

## 2024-11-13 NOTE — PROGRESS NOTES
[facility]  New Patient Office Visit      Maine Choe is a 39 year old female seen in consultation for Abdominal pain, epigastric at the request of Saundra Mayo PA-C.       Assessment and Plan:  #symptomatic cholelithiasis   #right posterior portal vein thrombosis     On review of her symptoms and imaging it seems likely her gallbladder is the culprit. She has a large gallstone in the gallbladder as well and I think she would benefit from cholecystectomy. However, she also has this portal vein thrombosis in the right posterior branch. It is asymptomatic although there is a risk of propagation. An MRI has already been ordered. I would also like her to meet with the GI liver specialists to see if they recommend anticoagulation or not. If anticoagulation is recommended then we will have to delay surgery. We will tentatively schedule surgery but may have to cancel depending on findings of MRI and recommendations from liver specialists.     We have discussed the indication, alternatives, risks and expected recovery.  Specifically we have discussed incisions, scarring, postoperative infections, anesthesia, bleeding, open conversion, common bile duct injury, injury to intra-abdominal organs, bile leak, DVT, PE, hernia, post cholecystectomy diarrhea, postoperative dietary restrictions and physical limitations.  We have discussed the recommended interventions and treatments for these complications.  All questions have been answered to the best of my ability.               Chief complaint:  Abdominal pain, epigastric    HPI:  Maine Choe is a 39 year old female who presents to clinic today for evaluation of several days of abdominal pain. The pain started on Friday. There were no clear exacerbating factors. The pain is located in the epigastrium and radiating around the right upper quadrant and up to the chest. The pain is worse with deep breathing. She went to the ED on Monday and a CT scan was performed. Her  symptoms improved and she was discharged home. She says the pain is better but she still feels sore in the upper abdomen and also have some cramps and pain in the lower abdomen but manageable. She denies any history of abdominal surgery.     She was seen recently in the ED and I have reviewed their documentation/notes    Past Medical History:  Past Medical History:   Diagnosis Date    Anxiety     Depressive disorder 2012    Seasonal allergic rhinitis     Uncomplicated asthma 2013       Past Surgical History:  Past Surgical History:   Procedure Laterality Date    CARPAL TUNNEL RELEASE RT/LT Bilateral     2020    TONSILLECTOMY  1989       Social History:  Social History     Tobacco Use    Smoking status: Former     Types: Cigarettes    Smokeless tobacco: Never    Tobacco comments:     A few cigarettes on the weekends for about 5 years, stopped at age 23   Vaping Use    Vaping status: Never Used   Substance Use Topics    Alcohol use: Yes     Comment: 2 drinks/week    Drug use: Not Currently     Types: Marijuana        Family History:  Family History   Problem Relation Age of Onset    Other Cancer Mother         Hodgkin's lymphoma    Hodgkin's lymphoma Mother     Diabetes Father     Diabetes Paternal Grandmother     Hodgkin's lymphoma Paternal Grandfather     Breast Cancer Other        Review of Systems:  The 10 point review of systems is negative other than noted in the HPI and above.    Physical Exam:  Vitals: /82   Pulse 81   Resp 16   Wt 120.7 kg (266 lb)   LMP 11/08/2024 (Approximate)   SpO2 96%   BMI 42.93 kg/m    BMI= Body mass index is 42.93 kg/m .  General - Well developed, well nourished female in no apparent distress  Abdomen: soft, not tender throughout including in the RUQ, no guarding or rebound, not distended   Neurologic: alert, speech is clear, nonfocal  Psychiatric: Mood and affect appropriate    Relevant labs:    WBC -   Lab Results   Component Value Date    WBC 10.5 11/11/2024       HgB -    Lab Results   Component Value Date    HGB 13.6 11/11/2024       Plt-   Lab Results   Component Value Date     11/11/2024       Liver Function Studies -   Recent Labs   Lab Test 11/11/24 2022   PROTTOTAL 6.6   ALBUMIN 3.9   BILITOTAL 0.2   ALKPHOS 66   AST 16   ALT 15       Lipase-   Lab Results   Component Value Date    LIPASE 23 11/11/2024           Imaging:  All imaging studies reviewed by me.    Recent Results (from the past 744 hours)   CT Chest (PE) Abdomen Pelvis w Contrast    Narrative    EXAM: CT CHEST PE ABDOMEN PELVIS W CONTRAST  LOCATION: [facility]  DATE: 11/11/2024    INDICATION: abdominal pain, dyspnea  COMPARISON: Chest radiograph 11/8/2023.  TECHNIQUE: CT chest pulmonary angiogram and routine CT abdomen pelvis with IV contrast. Arterial phase through the chest and venous phase through the abdomen and pelvis. Multiplanar reformats and MIP reconstructions were performed. Dose reduction   techniques were used.   CONTRAST: 100mL Isovue 370    FINDINGS:  ANGIOGRAM CHEST: No pulmonary embolus. No aortic dissection or aneurysm.     LUNGS AND PLEURA: Normal.    MEDIASTINUM/AXILLAE: Normal.    CORONARY ARTERY CALCIFICATION: None.    HEPATOBILIARY: Altered perfusion in the posterior right hepatic lobe secondary to right posterior portal vein thrombosis. No hepatic mass visualized. Smooth hepatic contour. Cholelithiasis.    PANCREAS: Normal.    SPLEEN: Normal.    ADRENAL GLANDS: Normal.    KIDNEYS/BLADDER: Normal.    BOWEL: Normal.    LYMPH NODES: Normal.    VASCULATURE: The right posterior portal vein is thrombosed (images 57-65 of axial series 13). The main, left, and right anterior portal veins are patent. Very mild aortic atherosclerosis. No aneurysm.    PELVIC ORGANS: Pessary in the vagina. Trace free pelvic fluid within physiologic range.    MUSCULOSKELETAL: Normal.      Impression    IMPRESSION:  1.  Thrombosis of the right posterior portal vein of uncertain etiology.  Further evaluation by MR with gadolinium suggested.  2.  Cholelithiasis.  3.  No pulmonary embolus or other acute process in the chest.       Rober Meléndez MD  [facility] [place]    Please route or send letter to:  Primary Care Provider (PCP)

## 2024-11-13 NOTE — LETTER
2024       Maine Choe  [address] [facility] [address]     RE: [id number]  : 1985    Maine Choe has been scheduled for surgery on 1-15-25 at 9:45 am at [facility] with Dr Rober Meléndez.  The hospital is located at [address] in [place].    Please check in at the Surgery reception desk at 7:45 am. This is located in the back of the hospital on the East side, just past the Emergency Room entrance.     DO NOT EAT OR DRINK ANYTHING 8 HOURS BEFORE YOUR ARRIVAL TIME.   You may have sips of clear liquids up until 2 hours before your arrival time. If you have been advised to take your medication, please do this early in the morning with just sips of clear liquid.     Hospital regulations require an updated pre-operative examination to be completed within 30 days of the procedure. This can be done by your primary care provider. Please ask them to fax documentation to [phone]. We also recommend you bring a copy with you.     You should shower before your surgery with Hibiclens or Exidine soap.  This can be found at your local pharmacy or you can pick it up from our office for free.  Please call our office if you have any questions.     You will be required to have an Adult (friend or family member) drive you home after your surgery and arrange for an adult to stay with you until the next morning.     You will receive several calls from our staff 3-7 days prior to your scheduled procedure with further details and to answer any questions you may have.    It is sometimes necessary to adjust the surgery schedule due to emergencies and additions to the schedule.  If your surgery is affected by this, we greatly appreciate your flexibility and understanding in this matter    It is best if you call regarding post-operative questions between the hours of 8:00 am & 3:00 pm Monday-Friday, so you have access to the daytime care team that know you best.  Prescription  refills are accepted during regular office hours only.    Please do not bring any Disability or FMLA papers to the hospital.  They need to be either faxed ([phone]), mailed or hand delivered to our office by you or a family member for completion.  Please allow 14 business days to complete paperwork.        If you have questions or concerns, please contact our office at [phone].

## 2024-11-13 NOTE — NURSING NOTE
Pt tolerated injections (Covid 19 12+ Pfizer Comirnaty Vaccine and Influenza Vaccine~ 6 months of age and older: Fluzone). Site ((Left & Right Deltoid)) was cleansed with alcohol prior to injections. No pain, burning, swelling or redness at the site of the injection. Patient instructed to remain in clinic for 15 minutes afterwards, and to report any adverse reactions

## 2024-11-13 NOTE — PATIENT INSTRUCTIONS
I am glad to hear that you will be visiting with the surgical team today to discuss management of your gallstones.  You can continue on with ibuprofen for pain management in the meantime, and of course if symptoms become acutely worse while you await that appointment, please seek immediate medical attention in the emergency department.    Your abdominal CT also noted concerns for a blood clot in your liver.  Occasionally CT scans are not able to visualize certain concerns closely enough to determine how significant they are, what they might be caused by, or offering a clear enough image to determine next steps.  For this reason, the radiologist is recommending follow-up imaging with an MRI.  It is difficult to know whether or not this blood clot is contributing to the discomfort you are having in your abdomen, or if that discomfort is more so related to your gallstones.  I will order the MRI so that we can have the imaging done to determine the best next steps in caring for you. Here is the number for Imaging Services. They will help you with scheduling your exam - [phone]    After review of your lipid panel from this summer, it looks as though your triglycerides are a bit elevated.  As we discussed in the clinic, based on the rest of your cholesterol panel, and your past medical history, this is not necessarily something that we will need to be managed with medication for you.  I am going to list some lifestyle adjustments below that can help to reduce triglyceride levels successfully.  These levels will be assessed yearly at your annual physical, so please be sure to implement these lifestyle changes, and follow-up with your primary care provider at your next physical examination for reevaluation.  This will be a good time to discuss possible needs for adjustment in your plan of care.    I would recommend you have a goal of 150 minutes of vigorous exercise per week.  Vigorous exercises anything that gets  your heart rate high enough to be above its resting rate.  This does not necessarily need to be achieved at the gym, or with more strict exercise routines.  Many people get their heart rate up doing normal daily tasks such as mowing the lawn, housework, or going for a walk.     I would recommend that you adjust your diet to incorporate more whole foods.  I would encourage you to follow what is called a Mediterranean diet, which has the bulk of its intake surrounding fresh fruits and vegetables.  Carbohydrates in his diet are generally taken in through whole grains such as brown rice.  Protein and fats in his diet come from lean meats such as fish and turkey, and healthy fats such as avocado and olive oil.  Avoiding foods that are high fat, processed, deep-fried, or fast foods, can also be a great way to help avoid excess triglycerides in your diet.    It is important to seek immediate medical attention if you are having symptoms of chest pain, fever, shortness of breath, palpitations, lightheadedness or dizziness, blurry or double vision, or sweating headache

## 2024-11-13 NOTE — PROGRESS NOTES
Assessment & Plan     (R93.2) Abnormal liver CT  (primary encounter diagnosis)  Comment: Acute with undetermined prognosis.  Largely incidental finding on abdominal CT in relation to evaluation for gallstones and upper abdominal pain in the emergency department.  Difficult to determine whether or not the clot is related to some of the upper abdominal pain that patient is experiencing, but the recommendation moving forward is an MRI with contrast for further evaluation.  Will order this MRI today, and educated patient on next steps, as they will be largely dictated by the results of the MRI.  She can continue on with ibuprofen for pain management, but considering some of her other findings, I might avoid Tylenol at the moment. Offered education on medications including appropriate dosing, possible side effects, and possible adverse effects.  Education given on return to clinic instructions as well as alarm signs that would require the need for immediate medical attention.  Patient attested to understanding.  Plan: MR Liver wo & w Contrast    (E78.1) Hypertriglyceridemia  Comment: Chronic.  Hypertriglyceridemia of 3 of 6 noted on lipid panel at annual physical in July.  Patient reported that there is no specific follow-up, and she is very curious about this today.  Reviewed lab results in depth with patient, and broke down entire lipid panel in depth for patient.  Thorough discussion on triglycerides and a contribution to high cholesterol and other findings in the body.  Based on her 10-year ASCVD risk score of 0.3%, and her other past medical history findings, this is not a finding that would warrant the need for medication management at this time.  Did review a number of lifestyle adjustments that she can implement as a way to reduce her triglycerides moving forward.  Patient attested to understanding, and did reiterate the importance of continued follow-up at her annual physical for review of these findings.  Education given on return to clinic instructions as well as alarm signs that would require the need for immediate medical attention.  Patient attested to understanding.  Plan: Implement lifestyle changes and follow-up at annual physical for ongoing management    (K80.20) Gallstones  Comment: Acute and stable.  Upper abdominal pain is what brought patient to the emergency department initially, and CT scan did appreciate significant gallstones.  Surgical referral was placed, and patient will follow-up with the surgical team today for further discussion on management plan.  She declines ongoing acute and worsening pain in the upper abdomen, but does describe a diffuse and generalized abdominal pain that is quite low level.  She has been utilizing ibuprofen, which does seem to manage this well.  Declining other more profound symptoms that would warrant the need for immediate medical attention today, and educated her that the next best step is to follow-up with the surgical team for further recommendations and management. Education given on return to clinic instructions as well as alarm signs that would require the need for immediate medical attention.  Patient attested to understanding.  Plan: Follow-up with surgery    (Z23) Encounter for immunization  Comment: No history of adverse reaction to immunization.  After informed consent was offered, patient elected to move forward with immunization in the clinic today.  Plan: INFLUENZA VACCINE,SPLIT         VIRUS,TRIVALENT,PF(FLUZONE), COVID-19 12+         (PFIZER)      This progress note has been dictated, with use of voice recognition software. Any grammatical, typographical, or context errors are unintentional and inherent to use of voice recognition software.     Independent interpretation of a test performed by another physician/other qualified health care professional (not separately reported) - abdominal CT and lipid panel  No LOS data to display   Time spent by me  today doing chart review, history and exam, documentation and further activities per the note      FUTURE APPOINTMENTS:       - Make appointment with surgical specialist       - Follow-up for annual visit or as needed  Patient Instructions   I am glad to hear that you will be visiting with the surgical team today to discuss management of your gallstones.  You can continue on with ibuprofen for pain management in the meantime, and of course if symptoms become acutely worse while you await that appointment, please seek immediate medical attention in the emergency department.    Your abdominal CT also noted concerns for a blood clot in your liver.  Occasionally CT scans are not able to visualize certain concerns closely enough to determine how significant they are, what they might be caused by, or offering a clear enough image to determine next steps.  For this reason, the radiologist is recommending follow-up imaging with an MRI.  It is difficult to know whether or not this blood clot is contributing to the discomfort you are having in your abdomen, or if that discomfort is more so related to your gallstones.  I will order the MRI so that we can have the imaging done to determine the best next steps in caring for you. Here is the number for Imaging Services. They will help you with scheduling your exam - [phone]    After review of your lipid panel from this summer, it looks as though your triglycerides are a bit elevated.  As we discussed in the clinic, based on the rest of your cholesterol panel, and your past medical history, this is not necessarily something that we will need to be managed with medication for you.  I am going to list some lifestyle adjustments below that can help to reduce triglyceride levels successfully.  These levels will be assessed yearly at your annual physical, so please be sure to implement these lifestyle changes, and follow-up with your primary care provider at your next physical  examination for reevaluation.  This will be a good time to discuss possible needs for adjustment in your plan of care.    I would recommend you have a goal of 150 minutes of vigorous exercise per week.  Vigorous exercises anything that gets your heart rate high enough to be above its resting rate.  This does not necessarily need to be achieved at the gym, or with more strict exercise routines.  Many people get their heart rate up doing normal daily tasks such as mowing the lawn, housework, or going for a walk.     I would recommend that you adjust your diet to incorporate more whole foods.  I would encourage you to follow what is called a Mediterranean diet, which has the bulk of its intake surrounding fresh fruits and vegetables.  Carbohydrates in his diet are generally taken in through whole grains such as brown rice.  Protein and fats in his diet come from lean meats such as fish and turkey, and healthy fats such as avocado and olive oil.  Avoiding foods that are high fat, processed, deep-fried, or fast foods, can also be a great way to help avoid excess triglycerides in your diet.    It is important to seek immediate medical attention if you are having symptoms of chest pain, fever, shortness of breath, palpitations, lightheadedness or dizziness, blurry or double vision, or sweating headache      Subjective   Maine is a 39 year old, presenting for the following health issues:  RECHECK (Patient reports they are here following ED visit 11/11/24 for abdominal and back pain. Has gallstones and blood clot in liver. Has a surgery consul today at 9AM. Wants to talk about high cholesterol from her last physical. )        11/13/2024     7:08 AM   Additional Questions   Roomed by Rosanne   Accompanied by alone         11/13/2024     7:08 AM   Patient Reported Additional Medications   Patient reports taking the following new medications none     HPI   Maine is a 39-year-old female with a past medical history significant for  patellar tendinitis, tennis elbow, acne vulgaris, depression, menstrual migraines, and hypertriglyceridemia who presents today after being seen in the emergency department with upper abdominal pain.  Patient presented to the emergency department on November 11 after sustaining significant upper abdominal pain.  Evaluation with CT and lab work revealed elevated D-dimer and a CT scan noting significant gallstones and a blood clot in her liver.  Patient was discharged with a prescription for oxycodone for pain management and a referral to surgery to manage the gallstones.  She presents today noting that the pain has largely resolved.  A single dose of oxycodone on the night of discharge was helpful to manage the pain at that time, but since then she has only needed ibuprofen for pain management, which has been quite successful.  She declines any acute, severe, or stabbing upper abdominal pain since discharge, but attest to some more generalized and low-level abdominal discomfort.  She declines any nausea, vomiting, reflux, bloating, changes in her urinary habits, diarrhea, constipation, blood in her stool, or pain with defecation.  She declines any fever, chills, body aches, lightheadedness or dizziness, blurry or double vision, chest pain, shortness of breath, or upper respiratory illness symptoms.  She is looking forward to meeting with the surgical team today for further recommendations and a plan regarding the gallstones, but she still does have questions about the blood clot in the liver.  It is difficult to determine whether or not the blood clot was contributing to her discomfort, but it is a bit less likely that this was the contributing factor in comparison to the gallstones.  The CT readout did recommend follow-up imaging with an MRI with contrast, so I will place this order today for further evaluation.  Patient is otherwise wanting to discuss findings of her lipid panel from her annual physical in July.   "She notes that the lipid panel appreciated quite elevated triglyceride levels, but she feels that follow-up was not significantly done.  Patient has never been diagnosed with hyperlipidemia, and is not currently taking a medication to manage this.  She does note that she has a fairly poorly balanced diet, and likely does not get enough fruits and vegetables through her meals.  She declines specific or consistent physical activity on a daily basis.  She otherwise declines excessive alcohol intake.  She does not have a history of hypertension or type 2 diabetes, and has no history of cardiovascular abnormalities.    Review of Systems  Constitutional, HEENT, cardiovascular, pulmonary, gi and gu systems are negative, except as otherwise noted.      Objective    /74 (BP Location: Left arm, Patient Position: Sitting, Cuff Size: Adult Large)   Pulse 78   Temp 98.8  F (37.1  C) (Oral)   Resp 25   Ht 1.676 m (5' 6\")   Wt 122 kg (268 lb 14.4 oz)   LMP 11/08/2024 (Approximate)   SpO2 98%   Breastfeeding No   BMI 43.40 kg/m    Body mass index is 43.4 kg/m .  Physical Exam   GENERAL: alert and no distress  EYES: Eyes grossly normal to inspection, PERRL and conjunctivae and sclerae normal  HENT: ear canals and TM's normal, nose and mouth without ulcers or lesions  NECK: no adenopathy, no asymmetry, masses, or scars  RESP: lungs clear to auscultation - no rales, rhonchi or wheezes  CV: regular rate and rhythm, normal S1 S2, no S3 or S4, no murmur, click or rub, no peripheral edema  ABDOMEN: soft, nontender, no hepatosplenomegaly, no masses and bowel sounds normal  MS: no gross musculoskeletal defects noted, no edema    Admission on 11/11/2024, Discharged on 11/12/2024   Component Date Value Ref Range Status    Sodium 11/11/2024 138  135 - 145 mmol/L Final    Potassium 11/11/2024 4.0  3.4 - 5.3 mmol/L Final    Chloride 11/11/2024 103  98 - 107 mmol/L Final    Carbon Dioxide (CO2) 11/11/2024 21 (L)  22 - 29 mmol/L " Final    Anion Gap 11/11/2024 14  7 - 15 mmol/L Final    Urea Nitrogen 11/11/2024 11.1  6.0 - 20.0 mg/dL Final    Creatinine 11/11/2024 0.76  0.51 - 0.95 mg/dL Final    GFR Estimate 11/11/2024 >90  >60 mL/min/1.73m2 Final    eGFR calculated using 2021 CKD-EPI equation.    Calcium 11/11/2024 8.7 (L)  8.8 - 10.4 mg/dL Final    Reference intervals for this test were updated on 7/16/2024 to reflect our healthy population more accurately. There may be differences in the flagging of prior results with similar values performed with this method. Those prior results can be interpreted in the context of the updated reference intervals.    Glucose 11/11/2024 138 (H)  70 - 99 mg/dL Final    WBC Count 11/11/2024 10.5  4.0 - 11.0 10e3/uL Final    RBC Count 11/11/2024 4.83  3.80 - 5.20 10e6/uL Final    Hemoglobin 11/11/2024 13.6  11.7 - 15.7 g/dL Final    Hematocrit 11/11/2024 41.5  35.0 - 47.0 % Final    MCV 11/11/2024 86  78 - 100 fL Final    MCH 11/11/2024 28.2  26.5 - 33.0 pg Final    MCHC 11/11/2024 32.8  31.5 - 36.5 g/dL Final    RDW 11/11/2024 12.7  10.0 - 15.0 % Final    Platelet Count 11/11/2024 372  150 - 450 10e3/uL Final    % Neutrophils 11/11/2024 57  % Final    % Lymphocytes 11/11/2024 33  % Final    % Monocytes 11/11/2024 7  % Final    % Eosinophils 11/11/2024 2  % Final    % Basophils 11/11/2024 1  % Final    % Immature Granulocytes 11/11/2024 0  % Final    NRBCs per 100 WBC 11/11/2024 0  <1 /100 Final    Absolute Neutrophils 11/11/2024 5.9  1.6 - 8.3 10e3/uL Final    Absolute Lymphocytes 11/11/2024 3.5  0.8 - 5.3 10e3/uL Final    Absolute Monocytes 11/11/2024 0.8  0.0 - 1.3 10e3/uL Final    Absolute Eosinophils 11/11/2024 0.2  0.0 - 0.7 10e3/uL Final    Absolute Basophils 11/11/2024 0.1  0.0 - 0.2 10e3/uL Final    Absolute Immature Granulocytes 11/11/2024 0.0  <=0.4 10e3/uL Final    Absolute NRBCs 11/11/2024 0.0  10e3/uL Final    Hold Specimen 11/11/2024 [facility]   Final    Hold Specimen 11/11/2024 [facility]   Final     Protein Total 11/11/2024 6.6  6.4 - 8.3 g/dL Final    Albumin 11/11/2024 3.9  3.5 - 5.2 g/dL Final    Bilirubin Total 11/11/2024 0.2  <=1.2 mg/dL Final    Alkaline Phosphatase 11/11/2024 66  40 - 150 U/L Final    AST 11/11/2024 16  0 - 45 U/L Final    ALT 11/11/2024 15  0 - 50 U/L Final    Bilirubin Direct 11/11/2024 <0.20  0.00 - 0.30 mg/dL Final    Lipase 11/11/2024 23  13 - 60 U/L Final    hCG Serum Qualitative 11/11/2024 Negative  Negative Final    This test is for screening purposes.  Results should be interpreted along with the clinical picture.  Confirmation testing is available if warranted by ordering [id number], HCG Quantitative Pregnancy.    D-Dimer Quantitative 11/11/2024 0.73 (H)  0.00 - 0.50 ug/mL FEU Final    Ventricular Rate 11/11/2024 75  BPM Final    Atrial Rate 11/11/2024 75  BPM Final    IA Interval 11/11/2024 160  ms Final    QRS Duration 11/11/2024 112  ms Final    QT 11/11/2024 384  ms Final    QTc 11/11/2024 428  ms Final    P Axis 11/11/2024 52  degrees Final    R AXIS 11/11/2024 59  degrees Final    T Axis 11/11/2024 30  degrees Final    Interpretation ECG 11/11/2024    Final                    Value:Sinus rhythm  Incomplete right bundle branch block  Borderline ECG  No previous ECGs available  Unconfirmed report - interpretation of this ECG is computer generated - see medical record for final interpretation  Confirmed by - EMERGENCY ROOM, PHYSICIAN ([id number]),  MISAEL MITCHELL ([id number]) on 11/12/2024 6:41:00 AM      Influenza A PCR 11/11/2024 Negative  Negative Final    Influenza B PCR 11/11/2024 Negative  Negative Final    RSV PCR 11/11/2024 Negative  Negative Final    SARS CoV2 PCR 11/11/2024 Negative  Negative Final    NEGATIVE: SARS-CoV-2 (COVID-19) RNA not detected, presumed negative.       Pamela Lozano DNP FNP-C  Family Nurse Practitioner - Same Day Provider  [facility]    Signed Electronically by: CRISTAL Cabrera CNP

## 2024-11-13 NOTE — LETTER
Showering Before Surgery      Your surgeon has asked you to take 2 showers before surgery.    Why is this important?  It is normal for bacteria (germs) to be on your skin. The skin protects us from these germs. When you have surgery, we cut the skin. Sometimes germs get into the cuts and cause infection (illness caused by germs). By following the instructions below and using special soap, you will lower the number of germs on your skin. This decreases your chance of infection.  Special soap  Buy or get 8 ounces of antiseptic surgical soap called 4% CHG. Common name brands of this soap are Hibiclens and Exidine.  You can find it at your local pharmacy, clinic or retail store. If you have trouble, ask your pharmacist to help you find the right substitute.  A note about shaving:  Do not shave within 12 inches of your incision (surgical cut) area for at least 3 days before surgery. Shaving can make small cuts in the skin. This puts you at a higher risk of infection.  Items you will need for each shower:  1 newly washed towel  4 ounces of one of the above soaps  Clean pajamas or clothes to change into    Follow these instructions:  Follow these steps the evening before surgery and the morning of surgery.  Wash your hair and body with your regular shampoo and soap. Make sure you rinse the shampoo and soap from your hair and body.  Using clean hands, apply about 2 ounces of soap gently on your skin from your ear lobes to your toes. Use on your groin area last. Do not use this soap on your face or head. If you get any soap in your eyes, ears or mouth, rinse right away.  Repeat step 2. It is very important to let the soap stay on your skin for at least 1 minute.    Rinse well and dry off using a clean towel.    If you feel any tingling, itching or other irritation, rinse right away. It is normal to feel some coolness on the skin after using the antiseptic soap. Your skin may feel a bit dry after the shower, but do not use  any lotions, creams or moisturizers. Do not use hair spray or other products in your hair.  5.  Dress in freshly washed clothes or pajamas. Use fresh pillowcases and sheets on your bed.    Repeat these steps the morning of surgery.  If you have any questions about showering or an allergy to CHG soap, please call your surgery center.    For informational purposes only. Not to replace the advice of your health care provider. Copyright   2012 [facility]. All rights reserved. Clinically reviewed by Infection Prevention [employer] Practice [employer] Education. [employer] 675250 - REV 12

## 2024-11-19 ENCOUNTER — TELEPHONE (OUTPATIENT)
Dept: GASTROENTEROLOGY | Facility: CLINIC | Age: 39
End: 2024-11-19
Payer: COMMERCIAL

## 2024-11-24 ENCOUNTER — HOSPITAL ENCOUNTER (OUTPATIENT)
Dept: MRI IMAGING | Facility: CLINIC | Age: 39
Discharge: HOME | End: 2024-11-24
Payer: COMMERCIAL

## 2024-11-24 DIAGNOSIS — R93.2 ABNORMAL LIVER CT: ICD-10-CM

## 2024-11-24 PROCEDURE — A9585 GADOBUTROL INJECTION: HCPCS

## 2024-11-24 PROCEDURE — 74183 MRI ABD W/O CNTR FLWD CNTR: CPT

## 2024-11-24 PROCEDURE — 255N000002 HC RX 255 OP 636

## 2024-11-24 RX ORDER — GADOBUTROL 604.72 MG/ML
12 INJECTION INTRAVENOUS ONCE
Status: COMPLETED | OUTPATIENT
Start: 2024-11-24 | End: 2024-11-24

## 2024-11-24 RX ADMIN — GADOBUTROL 12 ML: 604.72 INJECTION INTRAVENOUS at 08:49

## 2024-11-26 DIAGNOSIS — I81 PORTAL VEIN THROMBOSIS: Primary | ICD-10-CM

## 2024-12-03 DIAGNOSIS — I81 PORTAL VEIN THROMBOSIS: Primary | ICD-10-CM

## 2024-12-03 PROCEDURE — 99207 E-CONSULT TO HEMATOLOGY (ADULT OUTPT PROVIDER TO SPECIALIST WRITTEN QUESTION & RESPONSE): CPT | Performed by: STUDENT IN AN ORGANIZED HEALTH CARE EDUCATION/TRAINING PROGRAM

## 2024-12-03 NOTE — PROGRESS NOTES
Surgery telephone call:     I called the patient today. The hematologist does not have clinic time available in the near future but is available for e-consult. I placed the e-consult order for referral to hematology and discussed this with the patient as well who agrees to an e-consult.     Rober Meléndez MD

## 2024-12-04 ENCOUNTER — E-CONSULT (OUTPATIENT)
Dept: HEMATOLOGY | Facility: CLINIC | Age: 39
End: 2024-12-04
Payer: COMMERCIAL

## 2024-12-04 PROCEDURE — 99451 NTRPROF PH1/NTRNET/EHR 5/>: CPT | Performed by: INTERNAL MEDICINE

## 2024-12-04 NOTE — PROGRESS NOTES
12/4/2024     E-Consult has been accepted.    Interprofessional consultation requested by:  Rober Meléndez MD      Clinical Question/Purpose: MY CLINICAL QUESTION IS: thrombus in branch of portal vein - needs anticoagulation or no?     Patient assessment and information reviewed: 39 year old woman with incidentally discovered branch portal vein thrombosis with cholelithiasis (and associated inflammation) provoked this abdominal VTE given anatomic location.    Recommendations: 3 months of anticoagulation is reasonable given that cholelithiasis and associated local inflammation likely provoked the VTE.  Would not have to delay surgery this this (no embolism risk and duration of clot is unknown).  Would stop anticoagulation 2 days prior to surgery and resume 24-48 hours post op.     The recommendations provided in this E-Consult are based on a review of clinical data pertinent to the clinical question presented, without a review of the patient's complete medical record or, the benefit of a comprehensive in-person or virtual patient evaluation. This consultation should not replace the clinical judgement and evaluation of the provider ordering this E-Consult. Any new clinical issues, or changes in patient status since the filing of this E-Consult will need to be taken into account when assessing these recommendations. Please contact me if you have further questions.    My total time spent reviewing clinical information and formulating assessment was 5 minutes.        Javier Grey MD

## 2024-12-10 ENCOUNTER — MYC MEDICAL ADVICE (OUTPATIENT)
Dept: SURGERY | Facility: CLINIC | Age: 39
End: 2024-12-10

## 2024-12-12 DIAGNOSIS — I81 PORTAL VEIN THROMBOSIS: Primary | ICD-10-CM

## 2024-12-12 NOTE — PROGRESS NOTES
Surgery telephone call:     I called the patient to discuss the e-consult recommendations. Dr Grey has recommended 3 months of anticoagulation. I prescribed this today and prescribed eliquis. She should stop the medication 2 days prior to surgery. I will inform her PCP, Saundra Mayo PA-C as well. All questions have been answered.     Rober Meléndez MD

## 2024-12-12 NOTE — CONFIDENTIAL NOTE
Call placed to pt on 12/12 to answer questions about hematology e-consult. Staff explained the purpose of the econsult and the guidance provided by Dr. Grey. Reviewed that at this time, no follow up with hematology is being recommended. [place] will follow up with Dr. Meléndez as needed regarding the recommendations provided in the econsult.    Maria Antonia LEEN, RN, PHN   [facility]   Office: [phone]  Fax: [phone]

## 2024-12-18 ENCOUNTER — OFFICE VISIT (OUTPATIENT)
Dept: FAMILY MEDICINE | Facility: CLINIC | Age: 39
End: 2024-12-18
Payer: COMMERCIAL

## 2024-12-18 VITALS
OXYGEN SATURATION: 98 % | HEART RATE: 98 BPM | RESPIRATION RATE: 14 BRPM | TEMPERATURE: 98.1 F | HEIGHT: 66 IN | WEIGHT: 273 LBS | DIASTOLIC BLOOD PRESSURE: 75 MMHG | SYSTOLIC BLOOD PRESSURE: 111 MMHG | BODY MASS INDEX: 43.87 KG/M2

## 2024-12-18 DIAGNOSIS — K80.20 GALLSTONES: ICD-10-CM

## 2024-12-18 DIAGNOSIS — F33.0 MAJOR DEPRESSIVE DISORDER, RECURRENT EPISODE, MILD (H): ICD-10-CM

## 2024-12-18 DIAGNOSIS — Z01.818 PREOP GENERAL PHYSICAL EXAM: Primary | ICD-10-CM

## 2024-12-18 DIAGNOSIS — J45.20 MILD INTERMITTENT ASTHMA WITHOUT COMPLICATION: ICD-10-CM

## 2024-12-18 LAB
ALBUMIN SERPL BCG-MCNC: 4.1 G/DL (ref 3.5–5.2)
ALP SERPL-CCNC: 61 U/L (ref 40–150)
ALT SERPL W P-5'-P-CCNC: 21 U/L (ref 0–50)
ANION GAP SERPL CALCULATED.3IONS-SCNC: 11 MMOL/L (ref 7–15)
AST SERPL W P-5'-P-CCNC: 19 U/L (ref 0–45)
BILIRUB SERPL-MCNC: 0.3 MG/DL
BUN SERPL-MCNC: 16.1 MG/DL (ref 6–20)
CALCIUM SERPL-MCNC: 9.9 MG/DL (ref 8.8–10.4)
CHLORIDE SERPL-SCNC: 104 MMOL/L (ref 98–107)
CREAT SERPL-MCNC: 0.88 MG/DL (ref 0.51–0.95)
EGFRCR SERPLBLD CKD-EPI 2021: 84 ML/MIN/1.73M2
GLUCOSE SERPL-MCNC: 98 MG/DL (ref 70–99)
HCO3 SERPL-SCNC: 25 MMOL/L (ref 22–29)
HGB BLD-MCNC: 14.7 G/DL (ref 11.7–15.7)
POTASSIUM SERPL-SCNC: 4.5 MMOL/L (ref 3.4–5.3)
PROT SERPL-MCNC: 7 G/DL (ref 6.4–8.3)
SODIUM SERPL-SCNC: 140 MMOL/L (ref 135–145)

## 2024-12-18 PROCEDURE — 36415 COLL VENOUS BLD VENIPUNCTURE: CPT

## 2024-12-18 PROCEDURE — 99214 OFFICE O/P EST MOD 30 MIN: CPT

## 2024-12-18 PROCEDURE — 85018 HEMOGLOBIN: CPT

## 2024-12-18 PROCEDURE — 80053 COMPREHEN METABOLIC PANEL: CPT

## 2024-12-18 NOTE — PROGRESS NOTES
Preoperative Evaluation  [facility]  [address] [facility] [address]  Phone: [phone]  Primary Provider: Saundra Mayo PA-C  Pre-op Performing Provider: Saundra Mayo PA-C  Dec 18, 2024         12/13/2024   Surgical Information   What procedure is being done? Gallbladder removal    Facility or Hospital where procedure/surgery will be performed: [facility]    Who is doing the procedure / surgery? Dr. Haque    Date of surgery / procedure: 1/15/25    Time of surgery / procedure: 9:40 am   Where do you plan to recover after surgery? at home with family        Patient-reported     Fax number for surgical facility: Note does not need to be faxed, will be available electronically in Epic.    Assessment & Plan     The proposed surgical procedure is considered INTERMEDIATE risk.    (Z01.818) Preop general physical exam  (primary encounter diagnosis)  (K80.20) Gallstones  Approval given for above listed procedure. Blood work has been stable. No new concerns symptoms that would warrant further workup prior to surgical procedure.   Plan: Comprehensive metabolic panel (BMP + Alb, Alk         Phos, ALT, AST, Total. Bili, TP), Hemoglobin          (F33.0) Major depressive disorder, recurrent episode, mild (H)  Mood has been stable on current regimen. Will continue to monitor.    (J45.20) Mild intermittent asthma without complication  Well controlled with use of Albuterol PRN. Recommend bringing this to her upcoming surgical procedure.        - No identified additional risk factors other than previously addressed    Antiplatelet or Anticoagulation Medication Instructions   - apixaban (Eliquis), edoxaban (Savaysa), rivaroxaban (Xarelto): Bleeding risk is moderate or high for this procedure AND CrCl  (>=) 50 mL/min. DO NOT TAKE 2 days before surgery.     Additional Medication Instructions   - Diuretics: DO NOT TAKE on the day of surgery.   - Herbal medications and vitamins: DO NOT TAKE  14 days prior to surgery.   - diclofenac (Voltaren): DO NOT TAKE 1 day before surgery.   - SSRIs, SNRIs, TCAs, Antipsychotics: Continue without modification.     Recommendation  Approval given to proceed with proposed procedure, without further diagnostic evaluation.    Pooja Grove is a 39 year old, presenting for the following:  Pre-Op Exam        12/18/2024    10:03 AM   Additional Questions   Roomed by Radha GALICIA related to upcoming procedure: Still having occasional pains through the upper abdomen. Scheduled for gall bladder removal in mid-January. No chest pain, shortness of breath or other infectious symptoms.        12/13/2024   Pre-Op Questionnaire   Have you ever had a heart attack or stroke? No    Have you ever had surgery on your heart or blood vessels, such as a stent placement, a coronary artery bypass, or surgery on an artery in your head, neck, heart, or legs? No    Do you have chest pain with activity? No    Do you have a history of heart failure? No    Do you currently have a cold, bronchitis or symptoms of other infection? No    Do you have a cough, shortness of breath, or wheezing? No    Do you or anyone in your family have previous history of blood clots? No    Do you or does anyone in your family have a serious bleeding problem such as prolonged bleeding following surgeries or cuts? No    Have you ever had problems with anemia or been told to take iron pills? No    Have you had any abnormal blood loss such as black, tarry or bloody stools, or abnormal vaginal bleeding? No    Have you ever had a blood transfusion? No    Are you willing to have a blood transfusion if it is medically needed before, during, or after your surgery? Yes    Have you or any of your relatives ever had problems with anesthesia? No    Do you have sleep apnea, excessive snoring or daytime drowsiness? No    Do you have any artifical heart valves or other implanted medical devices like a pacemaker, defibrillator,  or continuous glucose monitor? No    Do you have artificial joints? No    Are you allergic to latex? No        Patient-reported     Health Care Directive  Patient does not have a Health Care Directive: Discussed advance care planning with patient; however, patient declined at this time.    Preoperative Review of    reviewed - one script with acute pain, otherwise no controlled substances in the last year and no ongoing prescriptions.     Status of Chronic Conditions:  See problem list for active medical problems.  Problems all longstanding and stable, except as noted/documented.  See ROS for pertinent symptoms related to these conditions.    Patient Active Problem List    Diagnosis Date Noted    Foreign body of skin of plantar aspect of right foot 09/11/2024     Priority: Medium    History of abnormal cervical Pap smear 07/16/2024     Priority: Medium     7/11/24 - NIL Pap, Neg HPV. Abnormal pap in her 20's, all normal since then      Tennis elbow 01/06/2024     Priority: Medium    Patellar tendonitis 01/02/2024     Priority: Medium    Acne vulgaris 01/04/2016     Priority: Medium    Major depressive disorder, recurrent episode, mild (H) 01/04/2016     Priority: Medium    Menstrual migraine without status migrainosus, not intractable 01/04/2016     Priority: Medium      Past Medical History:   Diagnosis Date    Anxiety     Depressive disorder 2012    Seasonal allergic rhinitis     Uncomplicated asthma 2013     Past Surgical History:   Procedure Laterality Date    CARPAL TUNNEL RELEASE RT/LT Bilateral     2020    TONSILLECTOMY  1989     Current Outpatient Medications   Medication Sig Dispense Refill    albuterol (PROVENTIL) (2.5 MG/3ML) 0.083% neb solution Take 2.5 mg by nebulization every 6 hours as needed      apixaban ANTICOAGULANT (ELIQUIS) 5 MG tablet Take 1 tablet (5 mg) by mouth 2 times daily. 180 tablet 0    diclofenac (VOLTAREN) 50 MG EC tablet Take 1 tablet (50 mg) by mouth 2 times daily 42 tablet 0  "   ELURYNG 0.12-0.015 MG/24HR vaginal ring Place 1 each vaginally every 28 days 3 each 3    escitalopram (LEXAPRO) 20 MG tablet Take 1 tablet (20 mg) by mouth daily at 2 pm 90 tablet 3    spironolactone (ALDACTONE) 50 MG tablet Take 1 tablet (50 mg) by mouth 3 times daily 270 tablet 3     Allergies   Allergen Reactions    Seasonal Allergies Cough, Difficulty breathing and Shortness Of Breath      Social History     Tobacco Use    Smoking status: Former     Types: Cigarettes    Smokeless tobacco: Never    Tobacco comments:     A few cigarettes on the weekends for about 5 years, stopped at age 23   Substance Use Topics    Alcohol use: Yes     Comment: 2 drinks/week     Family History   Problem Relation Age of Onset    Other Cancer Mother         Hodgkin's lymphoma    Hodgkin's lymphoma Mother     Diabetes Father     Diabetes Paternal Grandmother     Hodgkin's lymphoma Paternal Grandfather     Breast Cancer Other      History   Drug Use Unknown           Review of Systems  Constitutional, HEENT, cardiovascular, pulmonary, GI, , musculoskeletal, neuro, skin, endocrine and psych systems are negative, except as otherwise noted.    Objective    /75 (BP Location: Right arm, Patient Position: Chair, Cuff Size: Adult Large)   Pulse 98   Temp 98.1  F (36.7  C) (Oral)   Resp 14   Ht 1.676 m (5' 6\")   Wt 123.8 kg (273 lb)   LMP 11/08/2024 (Approximate)   SpO2 98%   BMI 44.06 kg/m     Estimated body mass index is 44.06 kg/m  as calculated from the following:    Height as of this encounter: 1.676 m (5' 6\").    Weight as of this encounter: 123.8 kg (273 lb).  Physical Exam  GENERAL: alert and no distress  EYES: Eyes grossly normal to inspection, PERRL and conjunctivae and sclerae normal  HENT: ear canals and TM's normal, nose and mouth without ulcers or lesions  NECK: no adenopathy, no asymmetry, masses, or scars  RESP: lungs clear to auscultation - no rales, rhonchi or wheezes  CV: regular rate and rhythm, normal " S1 S2, no S3 or S4, no murmur, click or rub, no peripheral edema  MS: no gross musculoskeletal defects noted, no edema  SKIN: no suspicious lesions or rashes  NEURO: Normal strength and tone, mentation intact and speech normal  PSYCH: mentation appears normal, affect normal/bright    Recent Labs   Lab Test 11/11/24 2022 07/11/24  0908   HGB 13.6  --      --     137   POTASSIUM 4.0 4.9   CR 0.76 0.87        Diagnostics  Labs pending at this time.  Results will be reviewed when available.   No EKG required, no history of coronary heart disease, significant arrhythmia, peripheral arterial disease or other structural heart disease.    Revised Cardiac Risk Index (RCRI)  The patient has the following serious cardiovascular risks for perioperative complications:   - No serious cardiac risks = 0 points     RCRI Interpretation: 0 points: Class I (very low risk - 0.4% complication rate)    Signed Electronically by: Saundra Mayo PA-C  A copy of this evaluation report is provided to the requesting physician.

## 2024-12-18 NOTE — PATIENT INSTRUCTIONS
How to Take Your Medication Before Surgery  Preoperative Medication Instructions   Antiplatelet or Anticoagulation Medication Instructions   - apixaban (Eliquis), edoxaban (Savaysa), rivaroxaban (Xarelto): Bleeding risk is moderate or high for this procedure AND CrCl  (>=) 50 mL/min. DO NOT TAKE 2 days before surgery.     Additional Medication Instructions   - Diuretics: DO NOT TAKE on the day of surgery.   - Herbal medications and vitamins: DO NOT TAKE 14 days prior to surgery.   - diclofenac (Voltaren): DO NOT TAKE 1 day before surgery.   - SSRIs, SNRIs, TCAs, Antipsychotics: Continue without modification.         Patient Education   Preparing for Your Surgery  For Adults  Getting started  In most cases, a nurse will call to review your health history and instructions. They will give you an arrival time based on your scheduled surgery time. Please be ready to share:  Your doctor's clinic name and phone number  Your medical, surgical, and anesthesia history  A list of allergies and sensitivities  A list of medicines, including herbal treatments and over-the-counter drugs  Whether the patient has a legal guardian (ask how to send us the papers in advance)  Note: You may not receive a call if you were seen at our PAC (Preoperative Assessment Center).  Please tell us if you're pregnant--or if there's any chance you might be pregnant. Some surgeries may injure a fetus (unborn baby), so they require a pregnancy test. Surgeries that are safe for a fetus don't always need a test, and you can choose whether to have one.   Preparing for surgery  Within 10 to 30 days of surgery: Have a pre-op exam (sometimes called an H&P, or History and Physical). This can be done at a clinic or pre-operative center.  If you're having a , you may not need this exam. Talk to your care team.  At your pre-op exam, talk to your care team about all medicines you take. (This includes CBD oil and any drugs, such as THC, marijuana, and  other forms of cannabis.) If you need to stop any medicine before surgery, ask when to start taking it again.  This is for your safety. Many medicines and drugs can make you bleed too much during surgery. Some change how well surgery (anesthesia) drugs work.  Call your insurance company to let them know you're having surgery. (If you don't have insurance, call [phone].)  Call your clinic if there's any change in your health. This includes a scrape or scratch near the surgery site, or any signs of a cold (sore throat, runny nose, cough, rash, fever).  Eating and drinking guidelines  For your safety: Unless your surgeon tells you otherwise, follow the guidelines below.  Eat and drink as normal until 8 hours before you arrive for surgery. After that, no food or milk. You can spit out gum when you arrive.  Drink clear liquids until 2 hours before you arrive. These are liquids you can see through, like water, Gatorade, and Propel Water. They also include plain black coffee and tea (no cream or milk).  No alcohol for 24 hours before you arrive. The night before surgery, stop any drinks that contain THC.  If your care team tells you to take medicine on the morning of surgery, it's okay to take it with a sip of water. No other medicines or drugs are allowed (including CBD oil)--follow your care team's instructions.  If you have questions the day of surgery, call your hospital or surgery center.   Preventing infection  Shower or bathe the night before and the morning of surgery. Follow the instructions your clinic gave you. (If no instructions, use regular soap.)  Don't shave or clip hair near your surgery site. We'll remove the hair if needed.  Don't smoke or vape the morning of surgery. No chewing tobacco for 6 hours before you arrive. A nicotine patch is okay. You may spit out nicotine gum when you arrive.  For some surgeries, the surgeon will tell you to fully quit smoking and nicotine.  We will make every effort to  keep you safe from infection. We will:  Clean our hands often with soap and water (or an alcohol-based hand rub).  Clean the skin at your surgery site with a special soap that kills germs.  Give you a special gown to keep you warm. (Cold raises the risk of infection.)  Wear hair covers, masks, gowns, and gloves during surgery.  Give antibiotic medicine, if prescribed. Not all surgeries need this medicine.  What to bring on the day of surgery  Photo ID and insurance card  Copy of your health care directive, if you have one  Glasses and hearing aids (bring cases)  You can't wear contacts during surgery  Inhaler and eye drops, if you use them (tell us about these when you arrive)  CPAP machine or breathing device, if you use them  A few personal items, if spending the night  If you have . . .  A pacemaker, ICD (cardiac defibrillator), or other implant: Bring the ID card.  An implanted stimulator: Bring the remote control.  A legal guardian: Bring a copy of the certified (court-stamped) guardianship papers.  Please remove any jewelry, including body piercings. Leave jewelry and other valuables at home.  If you're going home the day of surgery  You must have a responsible adult drive you home. They should stay with you overnight as well.  If you don't have someone to stay with you, and you aren't safe to go home alone, we may keep you overnight. Insurance often won't pay for this.  After surgery  If it's hard to control your pain or you need more pain medicine, please call your surgeon's office.  Questions?   If you have any questions for your care team, list them here:   ____________________________________________________________________________________________________________________________________________________________________________________________________________________________________________________________  For informational purposes only. Not to replace the advice of your health care provider. Copyright    ,  [facility]. All rights reserved. Clinically reviewed by Zen Ireland MD. [employer] [id number] - REV .     Patient Education   Preparing for Your Surgery  For Adults  Getting started  In most cases, a nurse will call to review your health history and instructions. They will give you an arrival time based on your scheduled surgery time. Please be ready to share:  Your doctor's clinic name and phone number  Your medical, surgical, and anesthesia history  A list of allergies and sensitivities  A list of medicines, including herbal treatments and over-the-counter drugs  Whether the patient has a legal guardian (ask how to send us the papers in advance)  Note: You may not receive a call if you were seen at our PAC (Preoperative Assessment Center).  Please tell us if you're pregnant--or if there's any chance you might be pregnant. Some surgeries may injure a fetus (unborn baby), so they require a pregnancy test. Surgeries that are safe for a fetus don't always need a test, and you can choose whether to have one.   Preparing for surgery  Within 10 to 30 days of surgery: Have a pre-op exam (sometimes called an H&P, or History and Physical). This can be done at a clinic or pre-operative center.  If you're having a , you may not need this exam. Talk to your care team.  At your pre-op exam, talk to your care team about all medicines you take. (This includes CBD oil and any drugs, such as THC, marijuana, and other forms of cannabis.) If you need to stop any medicine before surgery, ask when to start taking it again.  This is for your safety. Many medicines and drugs can make you bleed too much during surgery. Some change how well surgery (anesthesia) drugs work.  Call your insurance company to let them know you're having surgery. (If you don't have insurance, call [phone].)  Call your clinic if there's any change in your health. This includes a scrape or scratch near the surgery  site, or any signs of a cold (sore throat, runny nose, cough, rash, fever).  Eating and drinking guidelines  For your safety: Unless your surgeon tells you otherwise, follow the guidelines below.  Eat and drink as normal until 8 hours before you arrive for surgery. After that, no food or milk. You can spit out gum when you arrive.  Drink clear liquids until 2 hours before you arrive. These are liquids you can see through, like water, Gatorade, and Propel Water. They also include plain black coffee and tea (no cream or milk).  No alcohol for 24 hours before you arrive. The night before surgery, stop any drinks that contain THC.  If your care team tells you to take medicine on the morning of surgery, it's okay to take it with a sip of water. No other medicines or drugs are allowed (including CBD oil)--follow your care team's instructions.  If you have questions the day of surgery, call your hospital or surgery center.   Preventing infection  Shower or bathe the night before and the morning of surgery. Follow the instructions your clinic gave you. (If no instructions, use regular soap.)  Don't shave or clip hair near your surgery site. We'll remove the hair if needed.  Don't smoke or vape the morning of surgery. No chewing tobacco for 6 hours before you arrive. A nicotine patch is okay. You may spit out nicotine gum when you arrive.  For some surgeries, the surgeon will tell you to fully quit smoking and nicotine.  We will make every effort to keep you safe from infection. We will:  Clean our hands often with soap and water (or an alcohol-based hand rub).  Clean the skin at your surgery site with a special soap that kills germs.  Give you a special gown to keep you warm. (Cold raises the risk of infection.)  Wear hair covers, masks, gowns, and gloves during surgery.  Give antibiotic medicine, if prescribed. Not all surgeries need this medicine.  What to bring on the day of surgery  Photo ID and insurance card  Copy of  your health care directive, if you have one  Glasses and hearing aids (bring cases)  You can't wear contacts during surgery  Inhaler and eye drops, if you use them (tell us about these when you arrive)  CPAP machine or breathing device, if you use them  A few personal items, if spending the night  If you have . . .  A pacemaker, ICD (cardiac defibrillator), or other implant: Bring the ID card.  An implanted stimulator: Bring the remote control.  A legal guardian: Bring a copy of the certified (court-stamped) guardianship papers.  Please remove any jewelry, including body piercings. Leave jewelry and other valuables at home.  If you're going home the day of surgery  You must have a responsible adult drive you home. They should stay with you overnight as well.  If you don't have someone to stay with you, and you aren't safe to go home alone, we may keep you overnight. Insurance often won't pay for this.  After surgery  If it's hard to control your pain or you need more pain medicine, please call your surgeon's office.  Questions?   If you have any questions for your care team, list them here:   ____________________________________________________________________________________________________________________________________________________________________________________________________________________________________________________________  For informational purposes only. Not to replace the advice of your health care provider. Copyright   2003, 2019 [facility]. All rights reserved. Clinically reviewed by Zen Ireland MD. [employer] [id number] - REV 08/24.

## 2025-01-11 ENCOUNTER — MYC REFILL (OUTPATIENT)
Dept: FAMILY MEDICINE | Facility: CLINIC | Age: 40
End: 2025-01-11
Payer: COMMERCIAL

## 2025-01-11 DIAGNOSIS — Z30.44 ENCOUNTER FOR SURVEILLANCE OF VAGINAL RING HORMONAL CONTRACEPTIVE DEVICE: ICD-10-CM

## 2025-01-13 ENCOUNTER — MYC MEDICAL ADVICE (OUTPATIENT)
Dept: FAMILY MEDICINE | Facility: CLINIC | Age: 40
End: 2025-01-13
Payer: COMMERCIAL

## 2025-01-13 DIAGNOSIS — Z30.44 ENCOUNTER FOR SURVEILLANCE OF VAGINAL RING HORMONAL CONTRACEPTIVE DEVICE: ICD-10-CM

## 2025-01-13 RX ORDER — ETONOGESTREL AND ETHINYL ESTRADIOL VAGINAL RING .015; .12 MG/D; MG/D
1 RING VAGINAL
Qty: 3 EACH | Refills: 3 | Status: SHIPPED | OUTPATIENT
Start: 2025-01-13

## 2025-01-13 RX ORDER — ETONOGESTREL AND ETHINYL ESTRADIOL .12; .015 MG/D; MG/D
1 RING VAGINAL
Qty: 3 EACH | Refills: 3 | Status: SHIPPED | OUTPATIENT
Start: 2025-01-13 | End: 2025-01-13

## 2025-01-15 ENCOUNTER — ANESTHESIA EVENT (OUTPATIENT)
Dept: SURGERY | Facility: CLINIC | Age: 40
End: 2025-01-15
Payer: COMMERCIAL

## 2025-01-15 ENCOUNTER — ANESTHESIA (OUTPATIENT)
Dept: SURGERY | Facility: CLINIC | Age: 40
End: 2025-01-15
Payer: COMMERCIAL

## 2025-01-15 ENCOUNTER — HOSPITAL ENCOUNTER (OUTPATIENT)
Facility: CLINIC | Age: 40
Discharge: HOME | End: 2025-01-15
Attending: STUDENT IN AN ORGANIZED HEALTH CARE EDUCATION/TRAINING PROGRAM | Admitting: STUDENT IN AN ORGANIZED HEALTH CARE EDUCATION/TRAINING PROGRAM
Payer: COMMERCIAL

## 2025-01-15 VITALS
BODY MASS INDEX: 45.03 KG/M2 | WEIGHT: 279 LBS | RESPIRATION RATE: 22 BRPM | TEMPERATURE: 96.8 F | SYSTOLIC BLOOD PRESSURE: 123 MMHG | HEART RATE: 71 BPM | OXYGEN SATURATION: 95 % | DIASTOLIC BLOOD PRESSURE: 80 MMHG

## 2025-01-15 DIAGNOSIS — K80.20 SYMPTOMATIC CHOLELITHIASIS: Primary | ICD-10-CM

## 2025-01-15 PROCEDURE — 250N000011 HC RX IP 250 OP 636: Performed by: NURSE ANESTHETIST, CERTIFIED REGISTERED

## 2025-01-15 PROCEDURE — 88304 TISSUE EXAM BY PATHOLOGIST: CPT | Mod: TC | Performed by: STUDENT IN AN ORGANIZED HEALTH CARE EDUCATION/TRAINING PROGRAM

## 2025-01-15 PROCEDURE — 47562 LAPAROSCOPIC CHOLECYSTECTOMY: CPT | Mod: AS | Performed by: PHYSICIAN ASSISTANT

## 2025-01-15 PROCEDURE — 250N000009 HC RX 250: Performed by: NURSE ANESTHETIST, CERTIFIED REGISTERED

## 2025-01-15 PROCEDURE — 250N000013 HC RX MED GY IP 250 OP 250 PS 637: Performed by: STUDENT IN AN ORGANIZED HEALTH CARE EDUCATION/TRAINING PROGRAM

## 2025-01-15 PROCEDURE — 272N000001 HC OR GENERAL SUPPLY STERILE: Performed by: STUDENT IN AN ORGANIZED HEALTH CARE EDUCATION/TRAINING PROGRAM

## 2025-01-15 PROCEDURE — 360N000080 HC SURGERY LEVEL 7, PER MIN: Performed by: STUDENT IN AN ORGANIZED HEALTH CARE EDUCATION/TRAINING PROGRAM

## 2025-01-15 PROCEDURE — 47562 LAPAROSCOPIC CHOLECYSTECTOMY: CPT | Performed by: STUDENT IN AN ORGANIZED HEALTH CARE EDUCATION/TRAINING PROGRAM

## 2025-01-15 PROCEDURE — 999N000141 HC STATISTIC PRE-PROCEDURE NURSING ASSESSMENT: Performed by: STUDENT IN AN ORGANIZED HEALTH CARE EDUCATION/TRAINING PROGRAM

## 2025-01-15 PROCEDURE — 710N000009 HC RECOVERY PHASE 1, LEVEL 1, PER MIN: Performed by: STUDENT IN AN ORGANIZED HEALTH CARE EDUCATION/TRAINING PROGRAM

## 2025-01-15 PROCEDURE — 250N000011 HC RX IP 250 OP 636: Performed by: ANESTHESIOLOGY

## 2025-01-15 PROCEDURE — 88304 TISSUE EXAM BY PATHOLOGIST: CPT | Mod: 26

## 2025-01-15 PROCEDURE — 710N000012 HC RECOVERY PHASE 2, PER MINUTE: Performed by: STUDENT IN AN ORGANIZED HEALTH CARE EDUCATION/TRAINING PROGRAM

## 2025-01-15 PROCEDURE — 370N000017 HC ANESTHESIA TECHNICAL FEE, PER MIN: Performed by: STUDENT IN AN ORGANIZED HEALTH CARE EDUCATION/TRAINING PROGRAM

## 2025-01-15 PROCEDURE — 250N000011 HC RX IP 250 OP 636: Performed by: STUDENT IN AN ORGANIZED HEALTH CARE EDUCATION/TRAINING PROGRAM

## 2025-01-15 PROCEDURE — 250N000009 HC RX 250: Performed by: STUDENT IN AN ORGANIZED HEALTH CARE EDUCATION/TRAINING PROGRAM

## 2025-01-15 PROCEDURE — 250N000025 HC SEVOFLURANE, PER MIN: Performed by: STUDENT IN AN ORGANIZED HEALTH CARE EDUCATION/TRAINING PROGRAM

## 2025-01-15 PROCEDURE — S2900 ROBOTIC SURGICAL SYSTEM: HCPCS | Performed by: STUDENT IN AN ORGANIZED HEALTH CARE EDUCATION/TRAINING PROGRAM

## 2025-01-15 PROCEDURE — 258N000003 HC RX IP 258 OP 636: Performed by: ANESTHESIOLOGY

## 2025-01-15 RX ORDER — OXYCODONE HYDROCHLORIDE 5 MG/1
5 TABLET ORAL
Status: COMPLETED | OUTPATIENT
Start: 2025-01-15 | End: 2025-01-15

## 2025-01-15 RX ORDER — NALOXONE HYDROCHLORIDE 0.4 MG/ML
0.1 INJECTION, SOLUTION INTRAMUSCULAR; INTRAVENOUS; SUBCUTANEOUS
Status: DISCONTINUED | OUTPATIENT
Start: 2025-01-15 | End: 2025-01-15 | Stop reason: HOSPADM

## 2025-01-15 RX ORDER — LIDOCAINE HYDROCHLORIDE 20 MG/ML
INJECTION, SOLUTION INFILTRATION; PERINEURAL PRN
Status: DISCONTINUED | OUTPATIENT
Start: 2025-01-15 | End: 2025-01-15

## 2025-01-15 RX ORDER — DEXAMETHASONE SODIUM PHOSPHATE 4 MG/ML
INJECTION, SOLUTION INTRA-ARTICULAR; INTRALESIONAL; INTRAMUSCULAR; INTRAVENOUS; SOFT TISSUE PRN
Status: DISCONTINUED | OUTPATIENT
Start: 2025-01-15 | End: 2025-01-15

## 2025-01-15 RX ORDER — ONDANSETRON 4 MG/1
4 TABLET, ORALLY DISINTEGRATING ORAL EVERY 30 MIN PRN
Status: DISCONTINUED | OUTPATIENT
Start: 2025-01-15 | End: 2025-01-15 | Stop reason: HOSPADM

## 2025-01-15 RX ORDER — OXYCODONE HYDROCHLORIDE 5 MG/1
5-10 TABLET ORAL EVERY 4 HOURS PRN
Qty: 6 TABLET | Refills: 0 | Status: SHIPPED | OUTPATIENT
Start: 2025-01-15 | End: 2025-01-17

## 2025-01-15 RX ORDER — HYDROMORPHONE HCL IN WATER/PF 6 MG/30 ML
0.4 PATIENT CONTROLLED ANALGESIA SYRINGE INTRAVENOUS EVERY 5 MIN PRN
Status: DISCONTINUED | OUTPATIENT
Start: 2025-01-15 | End: 2025-01-15 | Stop reason: HOSPADM

## 2025-01-15 RX ORDER — ACETAMINOPHEN 325 MG/1
650 TABLET ORAL EVERY 4 HOURS PRN
Qty: 50 TABLET | Refills: 0 | Status: SHIPPED | OUTPATIENT
Start: 2025-01-15

## 2025-01-15 RX ORDER — DEXAMETHASONE SODIUM PHOSPHATE 4 MG/ML
4 INJECTION, SOLUTION INTRA-ARTICULAR; INTRALESIONAL; INTRAMUSCULAR; INTRAVENOUS; SOFT TISSUE
Status: DISCONTINUED | OUTPATIENT
Start: 2025-01-15 | End: 2025-01-15 | Stop reason: HOSPADM

## 2025-01-15 RX ORDER — FENTANYL CITRATE 50 UG/ML
25 INJECTION, SOLUTION INTRAMUSCULAR; INTRAVENOUS EVERY 5 MIN PRN
Status: DISCONTINUED | OUTPATIENT
Start: 2025-01-15 | End: 2025-01-15 | Stop reason: HOSPADM

## 2025-01-15 RX ORDER — KETOROLAC TROMETHAMINE 30 MG/ML
INJECTION, SOLUTION INTRAMUSCULAR; INTRAVENOUS PRN
Status: DISCONTINUED | OUTPATIENT
Start: 2025-01-15 | End: 2025-01-15

## 2025-01-15 RX ORDER — CEFAZOLIN SODIUM/WATER 3 G/30 ML
3 SYRINGE (ML) INTRAVENOUS
Status: COMPLETED | OUTPATIENT
Start: 2025-01-15 | End: 2025-01-15

## 2025-01-15 RX ORDER — FENTANYL CITRATE 50 UG/ML
INJECTION, SOLUTION INTRAMUSCULAR; INTRAVENOUS PRN
Status: DISCONTINUED | OUTPATIENT
Start: 2025-01-15 | End: 2025-01-15

## 2025-01-15 RX ORDER — ONDANSETRON 2 MG/ML
INJECTION INTRAMUSCULAR; INTRAVENOUS PRN
Status: DISCONTINUED | OUTPATIENT
Start: 2025-01-15 | End: 2025-01-15

## 2025-01-15 RX ORDER — AMOXICILLIN 250 MG
1-2 CAPSULE ORAL 2 TIMES DAILY
Qty: 30 TABLET | Refills: 0 | Status: SHIPPED | OUTPATIENT
Start: 2025-01-15

## 2025-01-15 RX ORDER — HYDROMORPHONE HCL IN WATER/PF 6 MG/30 ML
0.2 PATIENT CONTROLLED ANALGESIA SYRINGE INTRAVENOUS EVERY 5 MIN PRN
Status: DISCONTINUED | OUTPATIENT
Start: 2025-01-15 | End: 2025-01-15 | Stop reason: HOSPADM

## 2025-01-15 RX ORDER — CEFAZOLIN SODIUM/WATER 3 G/30 ML
3 SYRINGE (ML) INTRAVENOUS SEE ADMIN INSTRUCTIONS
Status: DISCONTINUED | OUTPATIENT
Start: 2025-01-15 | End: 2025-01-15 | Stop reason: HOSPADM

## 2025-01-15 RX ORDER — ACETAMINOPHEN 325 MG/1
650 TABLET ORAL
Status: DISCONTINUED | OUTPATIENT
Start: 2025-01-15 | End: 2025-01-15 | Stop reason: HOSPADM

## 2025-01-15 RX ORDER — BUPIVACAINE HYDROCHLORIDE AND EPINEPHRINE 5; 5 MG/ML; UG/ML
INJECTION, SOLUTION EPIDURAL; INTRACAUDAL; PERINEURAL PRN
Status: DISCONTINUED | OUTPATIENT
Start: 2025-01-15 | End: 2025-01-15 | Stop reason: HOSPADM

## 2025-01-15 RX ORDER — ONDANSETRON 2 MG/ML
4 INJECTION INTRAMUSCULAR; INTRAVENOUS EVERY 30 MIN PRN
Status: DISCONTINUED | OUTPATIENT
Start: 2025-01-15 | End: 2025-01-15 | Stop reason: HOSPADM

## 2025-01-15 RX ORDER — PROPOFOL 10 MG/ML
INJECTION, EMULSION INTRAVENOUS CONTINUOUS PRN
Status: DISCONTINUED | OUTPATIENT
Start: 2025-01-15 | End: 2025-01-15

## 2025-01-15 RX ORDER — INDOCYANINE GREEN AND WATER 25 MG
1.5 KIT INJECTION ONCE
Status: COMPLETED | OUTPATIENT
Start: 2025-01-15 | End: 2025-01-15

## 2025-01-15 RX ORDER — PROPOFOL 10 MG/ML
INJECTION, EMULSION INTRAVENOUS PRN
Status: DISCONTINUED | OUTPATIENT
Start: 2025-01-15 | End: 2025-01-15

## 2025-01-15 RX ORDER — FENTANYL CITRATE 50 UG/ML
50 INJECTION, SOLUTION INTRAMUSCULAR; INTRAVENOUS EVERY 5 MIN PRN
Status: DISCONTINUED | OUTPATIENT
Start: 2025-01-15 | End: 2025-01-15 | Stop reason: HOSPADM

## 2025-01-15 RX ORDER — SODIUM CHLORIDE, SODIUM LACTATE, POTASSIUM CHLORIDE, CALCIUM CHLORIDE 600; 310; 30; 20 MG/100ML; MG/100ML; MG/100ML; MG/100ML
INJECTION, SOLUTION INTRAVENOUS CONTINUOUS
Status: DISCONTINUED | OUTPATIENT
Start: 2025-01-15 | End: 2025-01-15 | Stop reason: HOSPADM

## 2025-01-15 RX ORDER — LIDOCAINE 40 MG/G
CREAM TOPICAL
Status: DISCONTINUED | OUTPATIENT
Start: 2025-01-15 | End: 2025-01-15 | Stop reason: HOSPADM

## 2025-01-15 RX ORDER — GLYCOPYRROLATE 0.2 MG/ML
INJECTION, SOLUTION INTRAMUSCULAR; INTRAVENOUS PRN
Status: DISCONTINUED | OUTPATIENT
Start: 2025-01-15 | End: 2025-01-15

## 2025-01-15 RX ADMIN — ONDANSETRON 4 MG: 2 INJECTION INTRAMUSCULAR; INTRAVENOUS at 09:08

## 2025-01-15 RX ADMIN — ROCURONIUM BROMIDE 50 MG: 50 INJECTION, SOLUTION INTRAVENOUS at 08:01

## 2025-01-15 RX ADMIN — Medication 3 G: at 07:57

## 2025-01-15 RX ADMIN — DEXAMETHASONE SODIUM PHOSPHATE 8 MG: 4 INJECTION, SOLUTION INTRA-ARTICULAR; INTRALESIONAL; INTRAMUSCULAR; INTRAVENOUS; SOFT TISSUE at 08:01

## 2025-01-15 RX ADMIN — FENTANYL CITRATE 25 MCG: 50 INJECTION, SOLUTION INTRAMUSCULAR; INTRAVENOUS at 09:39

## 2025-01-15 RX ADMIN — INDOCYANINE GREEN AND WATER 1.5 MG: KIT at 07:01

## 2025-01-15 RX ADMIN — PROPOFOL 30 MCG/KG/MIN: 10 INJECTION, EMULSION INTRAVENOUS at 08:06

## 2025-01-15 RX ADMIN — KETOROLAC TROMETHAMINE 30 MG: 30 INJECTION, SOLUTION INTRAMUSCULAR at 09:08

## 2025-01-15 RX ADMIN — GLYCOPYRROLATE 0.2 MG: 0.2 INJECTION, SOLUTION INTRAMUSCULAR; INTRAVENOUS at 08:01

## 2025-01-15 RX ADMIN — SUGAMMADEX 200 MG: 100 INJECTION, SOLUTION INTRAVENOUS at 09:08

## 2025-01-15 RX ADMIN — MIDAZOLAM 2 MG: 1 INJECTION INTRAMUSCULAR; INTRAVENOUS at 07:57

## 2025-01-15 RX ADMIN — ROCURONIUM BROMIDE 10 MG: 50 INJECTION, SOLUTION INTRAVENOUS at 08:25

## 2025-01-15 RX ADMIN — ROCURONIUM BROMIDE 10 MG: 50 INJECTION, SOLUTION INTRAVENOUS at 08:52

## 2025-01-15 RX ADMIN — PROPOFOL 200 MG: 10 INJECTION, EMULSION INTRAVENOUS at 08:01

## 2025-01-15 RX ADMIN — SODIUM CHLORIDE, POTASSIUM CHLORIDE, SODIUM LACTATE AND CALCIUM CHLORIDE: 600; 310; 30; 20 INJECTION, SOLUTION INTRAVENOUS at 07:57

## 2025-01-15 RX ADMIN — HYDROMORPHONE HYDROCHLORIDE 1 MG: 1 INJECTION, SOLUTION INTRAMUSCULAR; INTRAVENOUS; SUBCUTANEOUS at 08:25

## 2025-01-15 RX ADMIN — FENTANYL CITRATE 25 MCG: 50 INJECTION, SOLUTION INTRAMUSCULAR; INTRAVENOUS at 10:11

## 2025-01-15 RX ADMIN — LIDOCAINE HYDROCHLORIDE 50 MG: 20 INJECTION, SOLUTION INFILTRATION; PERINEURAL at 08:01

## 2025-01-15 RX ADMIN — FENTANYL CITRATE 100 MCG: 50 INJECTION INTRAMUSCULAR; INTRAVENOUS at 08:01

## 2025-01-15 RX ADMIN — OXYCODONE HYDROCHLORIDE 5 MG: 5 TABLET ORAL at 11:45

## 2025-01-15 RX ADMIN — ACETAMINOPHEN 650 MG: 325 TABLET, FILM COATED ORAL at 11:02

## 2025-01-15 ASSESSMENT — ACTIVITIES OF DAILY LIVING (ADL)
ADLS_ACUITY_SCORE: 15

## 2025-01-15 NOTE — DISCHARGE INSTRUCTIONS
HOME CARE FOLLOWING LAPAROSCOPIC/ROBOTIC CHOLECYSTECTOMY  AISLINN Thomas, PRAFUL Mitchell, BRENDA Davis  You may resume your Eliquis tomorrow     INCISIONAL CARE:  Replace the bandage over your incisions DAILY until all drainage stops, or if more comfortable to have in place.  If present, leave the steri-strips (white paper tapes) in place for 14 days after surgery.  If Dermabond (a type of skin glue) is present, leave in place until it wears/flakes off (2-3 weeks).     BATHING:  OK to shower 48 hours after surgery.  Avoid baths for 1 week after surgery.  You may wash your hair at any time.  Gently pat your incision dry after bathing.  Do not apply lotions, creams, or ointments to incisions.    ACTIVITY:  Light Activity -- you may immediately be up and about as tolerated.  Walking is encouraged, increase as tolerated.  Driving/Light Work-- when comfortable and off narcotic pain medications.  Strenuous Work/Activity -- limit lifting to 20 pounds for 2 weeks.  Progressively increase with time.  Active Sports (running, biking, etc.) -- cautiously resume after 2 weeks.    DISCOMFORT:  Local anesthetic placed at surgery should provide relief for 4-8 hours.  Begin taking pain pills before discomfort is severe.  Take the pain medication with some food, when possible, to minimize side effects.  Intermittent use of ice packs may help during the first 1-3 weeks after surgery.  Expect gradual improvement.    Over-the-counter anti-inflammatory medications (i.e. Ibuprofen/Advil/Motrin or Naprosyn/Aleve) may be used per package instructions in addition to or while tapering off the narcotic pain medications to decrease swelling and sensitivity.  DO NOT TAKE these Anti-inflammatory medications if your primary physician has advised against doing so, or if you have acid reflux, ulcer, or bleeding disorder, or take blood-thinner medications.  Call your primary physician or the surgery office if you have  medication questions.    After laparoscopic cholecystectomy, you may have shoulder or upper back discomfort due to the gas used during surgery.  This is temporary and should resolve within 2-3 days.  Frequent short walks may help with this.  You may have decreased energy level for 1-2 weeks after surgery related to your recovery.    DIET:  Start with liquids and gradually increase diet as tolerated.  Drink plenty of fluids.  While taking pain medications, consider use of a stool softener, increase your fiber in your diet, or add a fiber supplement (like Metamucil, Citrucel) to help prevent constipation - a possible side effect of pain medications.  It is not uncommon to experience some bowel changes (loose stools or constipation) after surgery.  Your body has to adapt to you no longer having a gall bladder.  To help minimize this side effect, avoid fatty foods for 1-2 weeks after surgery.  You may then slowly increase the amount of fatty foods in your diet.      NAUSEA:  If nauseated from the anesthetic/pain meds; rest in bed, get up cautiously with assistance, and drink clear liquids (juice, tea, broth).    FOLLOW-UP AFTER SURGERY:  -Our office will contact you approximately 2-3 weeks after surgery to check on your progress and answer any questions you may have.  If you are doing well, you will not need to return for an office appointment.  If any concerns are identified over the phone, we will help you make an appointment to see a provider.    -If you have not received a phone call, have any questions or concerns, or would like to be seen, please call us at [phone].  We are located at: [address], [address]; [place] [address]    -CONTACT US IF THE FOLLOWING DEVELOPS:   1. A fever that is above 101     2. Increased redness, warmth, drainage, bleeding, or swelling.   3. Pain that is not relieved by rest/ice and your prescription.   4.  Increasing pain after 48 hours.   5. Drainage that is thick,  cloudy, yellow, green or white.   6. Any other questions or concerns.      FREQUENTLY ASKED QUESTIONS:    Q:  How should my incision look?    A:  Normally your incision will appear slightly swollen with light redness directly along the incision itself as it heals.  It may feel like a bump or ridge as the healing/scarring happens, and over time (3-4 months) this bump or ridge feeling should slowly go away.  In general, clear or pink watery drainage can be normal at first as your incision heals, but should decrease over time.    Q:  How do I know if my incision is infected?  A:  Look at your incision for signs of infection, like redness around the incision spreading to surrounding skin, or drainage of cloudy or foul-smelling drainage.  If you feel warm, check your temperature to see if you are running a fever.    **If any of these things occur, please notify the nurse at our office.  We may need you to come into the office for an incision check.      Q:  How do I take care of my incision?  A:  If you have a dressing in place - Starting the day after surgery, replace the dressing 1-2 times a day until there is no further drainage from the incision.  At that time, a dressing is no longer needed.  Try to minimize tape on the skin if irritation is occurring at the tape sites.  If you have significant irritation from tape on the skin, please call the office to discuss other method of dressing your incision.    Small pieces of tape called  steri-strips  may be present directly overlying your incision; these may be removed 10 days after surgery unless otherwise specified by your surgeon.  If these tapes start to loosen at the ends, you may trim them back until they fall off or are removed.    A:  If you had  Dermabond  tissue glue used as a dressing (this causes your incision to look shiny with a clear covering over it) - This type of dressing wears off with time and does not require more dressings over the top unless it is  draining around the glue as it wears off.  Do not apply ointments or lotions over the incisions until the glue has completely worn off.    Q:  There is a piece of tape or a sticky  lead  still on my skin.  Can I remove this?  A:  Sometimes the sticky  leads  used for monitoring during surgery or for evaluation in the emergency department are not all removed while you are in the hospital.  These sometimes have a tab or metal dot on them.  You can easily remove these on your own, like taking off a band-aid.  If there is a gel substance under the  lead , simply wipe/clean it off with a washcloth or paper towel.      Q:  What can I do to minimize constipation (very hard stools, or lack of stools)?  A:  Stay well hydrated.  Increase your dietary fiber intake or take a fiber supplement -with plenty of water.  Walk around frequently.  You may consider an over-the-counter stool-softener.  Your Pharmacist can assist you with choosing one that is stocked at your pharmacy.  Constipation is also one of the most common side effects of pain medication.  If you are using pain medication, be pro-active and try to PREVENT problems with constipation by taking the steps above BEFORE constipation becomes a problem.    Q:  What do I do if I need more pain medications?  A:  Call the office to receive refills.  Be aware that certain pain meds cannot be called into a pharmacy and actually require a paper prescription.  A change may be made in your pain med as you progress thru your recovery period or if you have side effects to certain meds.    --Pain meds are NOT refilled after 5pm on weekdays, and NOT AT ALL on the weekends, so please look ahead to prevent problems.      Q:  Why am I having a hard time sleeping now that I am at home?  A:  Many medications you receive while you are in the hospital can impact your sleep for a number of days after your surgery/hospitalization.  Decreased level of activity and naps during the day may also  make sleeping at night difficult.  Try to minimize day-time naps, and get up frequently during the day to walk around your home during your recovery time.  Sleep aides may be of some help, but are not recommended for long-term use.      Q:  I am having some back discomfort.  What should I do?  A:  This may be related to certain positioning that was required for your surgery, extended periods of time in bed, or other changes in your overall activity level.  You may try ice, heat, acetaminophen, or ibuprofen to treat this temporarily.  Note that many pain medications have acetaminophen in them and would state this on the prescription bottle.  Be sure not to exceed the maximum of 4000mg per day of acetaminophen.     **If the pain you are having does not resolve, is severe, or is a flare of back pain you have had on other occasions prior to surgery, please contact your primary physician for further recommendations or for an appointment to be examined at their office.    Q:  Why am I having headaches?  A:  Headaches can be caused by many things:  caffeine withdrawal, use of pain meds, dehydration, high blood pressure, lack of sleep, over-activity/exhaustion, flare-up of usual migraine headaches.  If you feel this is related to muscle tension (a band-like feeling around the head, or a pressure at the low-back of the head) you may try ice or heat to this area.  You may need to drink more fluids (try electrolyte drink like Gatorade), rest, or take your usual migraine medications.   **If your headaches do not resolve, worsen, are accompanied by other symptoms, or if your blood pressure is high, please call your primary physician for recommendation and/or examination.    Q:  I am unable to urinate.  What do I do?  A:  A small percentage of people can have difficulty urinating initially after surgery.  This includes being able to urinate only a very small amount at a time and feeling discomfort or pressure in the very low  abdomen.  This is called  urinary retention , and is actually an urgent situation.  Proceed to your nearest Emergency department for evaluation (not an Urgent Care Center).  Sometimes the bladder does not work correctly after certain medications you receive during surgery, or related to certain procedures.  You may need to have a catheter placed until your bladder recovers.  When planning to go to an Emergency department, it may help to call the ER to let them know you are coming in for this problem after a surgery.  This may help you get in quicker to be evaluated.  **If you have symptoms of a urinary tract infection, please contact your primary physician for the proper evaluation and treatment.          If you have other questions, please call the office Monday thru Friday between 8am and 4:30pm to discuss with the nurse or physician assistant.  #[phone]    There is a surgeon ON CALL on weekday evenings and over the weekend in case of urgent need only, and may be contacted at the same number.    If you are having an emergency, call 911 or proceed to your nearest emergency department.    Maximum acetaminophen (Tylenol) dose from all sources should not exceed 4 grams (4000 mg) per day.  You last had 650 mg at 1100am; do not take Tylenol products again until after 3 pm if needed.       Maximum ibuprofen (Advil) dose from all sources should not exceed 2.5 grams (2,400 mg) per day. You received Toradol, an IV form of Ibuprofen (Motrin) at 0900.  Do not take any Ibuprofen products until 3 pm or after.      Dr. Meléndez  [facility] [phone]

## 2025-01-15 NOTE — ANESTHESIA PROCEDURE NOTES
Airway       Patient location during procedure: OR       Procedure Start/Stop Times: 1/15/2025 8:02 AM  Staff -        CRNA: Kyle Partida APRN CRNA       Performed By: CRNA  Consent for Airway        Urgency: elective  Indications and Patient Condition       Indications for airway management: sonny-procedural       Induction type:intravenous       Mask difficulty assessment: 1 - vent by mask    Final Airway Details       Final airway type: endotracheal airway       Successful airway: ETT - single and Oral  Endotracheal Airway Details        ETT size (mm): 7.0       Cuffed: yes       Successful intubation technique: direct laryngoscopy       DL Blade Type: Miguel 2       Grade View of Cords: 2       Adjucts: stylet       Position: Right       Measured from: gums/teeth       Secured at (cm): 20       Bite block used: None    Post intubation assessment        Placement verified by: capnometry, equal breath sounds and chest rise        Number of attempts at approach: 1       Number of other approaches attempted: 0       Secured with: tape       Ease of procedure: easy       Dentition: Intact and Unchanged    Medication(s) Administered   Medication Administration Time: 1/15/2025 8:02 AM

## 2025-01-15 NOTE — OP NOTE
[facility] General Surgery Operative Note    Pre-operative diagnosis: symptomatic cholelithiasis   Post-operative diagnosis: same   Procedure: Robotic assisted laparoscopic cholecystectomy   Surgeon: Rober Meléndez MD   Assistant(s): Marichuy Niño PA-C  The Physician Assistant was medically necessary for their expertise in prepping, robotic instrument exchange, passing of material through port sites, closure of port sites, suturing and retraction.   Anesthesia: general   Estimated blood loss:  Specimen: 5 cc  gallbladder and contents               INDICATION FOR OPERATION: This is a 39 year old female who presented to clinic with abdominal pain. Studies including ultrasound were consistent with symptomatic cholelithiasis. We discussed robotic assisted laparoscopic cholecystectomy and the patient agreed to proceed after hearing the risks and benefits.    DESCRIPTION OF PROCEDURE:  The patient was taken to the operating room and placed on the table in supine position.  General endotracheal anesthesia was induced and the abdomen was prepped and draped in standard sterile fashion.     Access was gained in the left upper quadrant at [place] point with a 5mm 0 degree laparscopic with a visiport robotic trocar under direct visualization. The abdomen was insufflated with CO2.  Three additional 8mm robotic ports were placed across the mid-abdomen. The patient was placed in reverse Trendelenburg and right side up.  The robot was then docked.     The fundus of the gallbladder was grasped and retracted cephalad with a prograsp. The gallbladder appeared not inflamed but there was a large gallstone in the body of the gallbladder.  The infundibulum was grasped and retracted laterally.  The peritoneum over the medial and lateral aspects of the triangle of Calot was taken down with blunt dissection and cautery.  The cystic duct and artery were freed up from surrounding tissues.  The triangle of Calot was skeletonized  revealing the critical view of safety.  Intraoperative fluorescence was used to evaluate the biliary anatomy with no additional biliary structures lighting up other than the cystic duct and common bile duct.     The duct was clipped twice proximally, once distally, and the cystic artery was clipped once proximally, then both were transected, using cautery on the distal side of the cystic duct.  The gallbladder was then removed from the liver using the cautery.  Before the gallbladder was completely removed from the cystic plate fluorescence was again used to evaluate for any additional biliary structures and none seen. The gallbladder was passed into an Endocatch bag in the mid-left port site. The robot was then undocked and I scrubbed back in. The gallbladder was removed from the abdomen which did require enlarging the fascia and skin incisions quite a bit to get the gallbladder with the large gallstone out. The fascial defect was closed with several 0-Vicryl sutures using the Carson-Eldridge device. The remaining robotic ports were removed and hemostatic and the pneumoperitoneum evacuated. All of the incisions were closed with interrupted 4-0 Vicryl subcuticular sutures and Steri-Strips.  The patient tolerated the procedure well.  Sponge and instrument counts were correct.    FINDINGS:   1.) non-inflamed gallbladder with a large stone in the gallbladder     Rober Meléndez MD

## 2025-01-15 NOTE — ANESTHESIA PREPROCEDURE EVALUATION
Anesthesia Pre-Procedure Evaluation    Patient: Maine Choe   MRN: [id number] : 1985        Procedure : Procedure(s):  CHOLECYSTECTOMY, ROBOT-ASSISTED, LAPAROSCOPIC, USING DA LISSY XI          Past Medical History:   Diagnosis Date    Anxiety     Depressive disorder     Seasonal allergic rhinitis     Uncomplicated asthma       Past Surgical History:   Procedure Laterality Date    CARPAL TUNNEL RELEASE RT/LT Bilateral     2020    TONSILLECTOMY        Allergies   Allergen Reactions    Seasonal Allergies Cough, Difficulty breathing and Shortness Of Breath      Social History     Tobacco Use    Smoking status: Former     Types: Cigarettes    Smokeless tobacco: Never    Tobacco comments:     A few cigarettes on the weekends for about 5 years, stopped at age 23   Substance Use Topics    Alcohol use: Yes     Comment: 2 drinks/week      Wt Readings from Last 1 Encounters:   01/15/25 126.6 kg (279 lb)        Anesthesia Evaluation            ROS/MED HX  ENT/Pulmonary:     (+)                     Intermittent, asthma  Treatment: Inhaler prn,                 Neurologic:  - neg neurologic ROS     Cardiovascular:  - neg cardiovascular ROS     METS/Exercise Tolerance:     Hematologic:     (+) History of blood clots,    pt is anticoagulated,           Musculoskeletal:  - neg musculoskeletal ROS     GI/Hepatic:       Renal/Genitourinary:  - neg Renal ROS     Endo:  - neg endo ROS  (-) obesity   Psychiatric/Substance Use:  - neg psychiatric ROS     Infectious Disease:  - neg infectious disease ROS     Malignancy:  - neg malignancy ROS     Other:  - neg other ROS          Physical Exam    Airway        Mallampati: III   TM distance: < 3 FB   Neck ROM: full   Mouth opening: > 3 cm    Respiratory Devices and Support         Dental       (+) Completely normal teeth      Cardiovascular          Rhythm and rate: regular     Pulmonary           breath sounds clear to auscultation       Other findings: Off Eliquis  "since Sat (dx'd portal vein thombosis 1 mos ago - stable)    OUTSIDE LABS:  CBC:   Lab Results   Component Value Date    WBC 10.5 11/11/2024    HGB 14.7 12/18/2024    HGB 13.6 11/11/2024    HCT 41.5 11/11/2024     11/11/2024     BMP:   Lab Results   Component Value Date     12/18/2024     11/11/2024    POTASSIUM 4.5 12/18/2024    POTASSIUM 4.0 11/11/2024    CHLORIDE 104 12/18/2024    CHLORIDE 103 11/11/2024    CO2 25 12/18/2024    CO2 21 (L) 11/11/2024    BUN 16.1 12/18/2024    BUN 11.1 11/11/2024    CR 0.88 12/18/2024    CR 0.76 11/11/2024    GLC 98 12/18/2024     (H) 11/11/2024     COAGS: No results found for: \"PTT\", \"INR\", \"FIBR\"  POC:   Lab Results   Component Value Date    HCGS Negative 11/11/2024     HEPATIC:   Lab Results   Component Value Date    ALBUMIN 4.1 12/18/2024    PROTTOTAL 7.0 12/18/2024    ALT 21 12/18/2024    AST 19 12/18/2024    ALKPHOS 61 12/18/2024    BILITOTAL 0.3 12/18/2024     OTHER:   Lab Results   Component Value Date    A1C 5.4 05/30/2023    SHAHEED 9.9 12/18/2024    LIPASE 23 11/11/2024       Anesthesia Plan    ASA Status:  3    NPO Status:  NPO Appropriate    Anesthesia Type: General.   Induction: Intravenous.   Maintenance: Balanced.        Consents    Anesthesia Plan(s) and associated risks, benefits, and realistic alternatives discussed. Questions answered and patient/representative(s) expressed understanding.     - Discussed: Risks, Benefits and Alternatives for BOTH SEDATION and the PROCEDURE were discussed     - Discussed with:  Patient            Postoperative Care    Pain management: IV analgesics.   PONV prophylaxis: Ondansetron (or other 5HT-3), Dexamethasone or Solumedrol     Comments:               Barry Joseph MD                # Drug Induced Coagulation Defect: home medication list includes an anticoagulant medication              # Severe Obesity: Estimated body mass index is 45.03 kg/m  as calculated from the following:    Height as of " "12/18/24: 1.676 m (5' 6\").    Weight as of this encounter: 126.6 kg (279 lb).             "

## 2025-01-15 NOTE — BRIEF OP NOTE
[facility] [place]    Brief Operative Note    Pre-operative diagnosis: Symptomatic cholelithiasis [K80.20]  Post-operative diagnosis Same as pre-operative diagnosis    Procedure: CHOLECYSTECTOMY, ROBOT-ASSISTED, LAPAROSCOPIC, USING DA LISSY XI, N/A - Abdomen    Surgeon: Surgeons and Role:     * Rober Meléndez MD - Primary     * Marichuy Niño PA-C  Anesthesia: General   Estimated Blood Loss: Minimal    Drains: None  Specimens:   ID Type Source Tests Collected by Time Destination   1 : Gallbladder and contents Tissue Gallbladder SURGICAL PATHOLOGY EXAM Rober Meléndez MD 1/15/2025  8:50 AM      Findings:   Non-inflamed gallbladder with large stone .  Complications: None.  Implants: * No implants in log *

## 2025-01-15 NOTE — ANESTHESIA POSTPROCEDURE EVALUATION
Patient: Maine Choe    Procedure: Procedure(s):  CHOLECYSTECTOMY, ROBOT-ASSISTED, LAPAROSCOPIC, USING DA LISSY XI       Anesthesia Type:  General    Note:  Disposition: Inpatient   Postop Pain Control: Uneventful            Sign Out: Well controlled pain   PONV: No   Neuro/Psych: Uneventful            Sign Out: Acceptable/Baseline neuro status   Airway/Respiratory: Uneventful            Sign Out: Acceptable/Baseline resp. status   CV/Hemodynamics: Uneventful            Sign Out: Acceptable CV status; No obvious hypovolemia; No obvious fluid overload   Other NRE: NONE   DID A NON-ROUTINE EVENT OCCUR?            Last vitals:  Vitals Value Taken Time   /87 01/15/25 1030   Temp 96.62  F (35.9  C) 01/15/25 1034   Pulse 75 01/15/25 1034   Resp 19 01/15/25 1034   SpO2 96 % 01/15/25 1034   Vitals shown include unfiled device data.    Electronically Signed By: Barry Joseph MD  January 15, 2025  10:36 AM

## 2025-01-15 NOTE — ANESTHESIA CARE TRANSFER NOTE
Patient: Maine Choe    Procedure: Procedure(s):  CHOLECYSTECTOMY, ROBOT-ASSISTED, LAPAROSCOPIC, USING DA LISSY XI       Diagnosis: Symptomatic cholelithiasis [K80.20]  Diagnosis Additional Information: No value filed.    Anesthesia Type:   General     Note:    Oropharynx: oropharynx clear of all foreign objects and spontaneously breathing  Level of Consciousness: awake  Oxygen Supplementation: face mask  Level of Supplemental Oxygen (L/min / FiO2): 6  Independent Airway: airway patency satisfactory and stable  Dentition: dentition unchanged  Vital Signs Stable: post-procedure vital signs reviewed and stable  Report to RN Given: handoff report given  Patient transferred to: PACU    Handoff Report: Identifed the Patient, Identified the Reponsible Provider, Reviewed the pertinent medical history, Discussed the surgical course, Reviewed Intra-OP anesthesia mangement and issues during anesthesia, Set expectations for post-procedure period and Allowed opportunity for questions and acknowledgement of understanding      Vitals:  Vitals Value Taken Time   /91 01/15/25 0922   Temp 96.62  F (35.9  C) 01/15/25 0924   Pulse 98 01/15/25 0924   Resp 36 01/15/25 0924   SpO2 99 % 01/15/25 0924   Vitals shown include unfiled device data.    Electronically Signed By: CRISTAL Barnes CRNA  January 15, 2025  9:25 AM

## 2025-01-16 ENCOUNTER — TELEPHONE (OUTPATIENT)
Dept: SURGERY | Facility: CLINIC | Age: 40
End: 2025-01-16
Payer: COMMERCIAL

## 2025-01-16 NOTE — TELEPHONE ENCOUNTER
S/p Robotic assisted laparoscopic cholecystectomy  Procedure date: 1/15/24  Surgeon :  Dr. Meléndez     Patient reports pain and notes redness between steri strips at umbilical incision.  Small amount of thin drainage from the incision now that she has taken dressing off.  Wondering if this is normal?     Having increased pain when getting up and moving around.   She is taking oxycodone and Tylenol for pain which is controlling her pain adequately.      -Discussed that redness at umbilical incision is likely early bruising.  Explained that umbilical incision is often more painful than other incision sites.  She is ok to monitor this site.   -Keep incision covered until drainage stops.    -hold a small pillow over incision site for support when getting up or coughing/ sneezing.     Patient to call clinic if any thick drainage, fever/chills or growing redness at incision sites.  She verbalizes understanding of our conversation and agrees with plan.

## 2025-01-17 LAB
PATH REPORT.COMMENTS IMP SPEC: NORMAL
PATH REPORT.COMMENTS IMP SPEC: NORMAL
PATH REPORT.FINAL DX SPEC: NORMAL
PATH REPORT.GROSS SPEC: NORMAL
PATH REPORT.MICROSCOPIC SPEC OTHER STN: NORMAL
PATH REPORT.RELEVANT HX SPEC: NORMAL
PHOTO IMAGE: NORMAL

## 2025-02-05 ENCOUNTER — TELEPHONE (OUTPATIENT)
Dept: SURGERY | Facility: CLINIC | Age: 40
End: 2025-02-05
Payer: COMMERCIAL

## 2025-02-05 NOTE — CONFIDENTIAL NOTE
[facility]  Post op call note     Maine Choe was called for an update regarding her recovery.  She underwent laparoscopic cholecystectomy by Dr. Meléndez on 1/15/2025. Today she tells me she is doing well and denies any complaints. She is eating a normal diet and her bowels are regular. She states her wounds are healing well.    The pathology revealed chronic cholecystitis and cholelithiasis.  This was discussed with the patient.     She was instructed to slowly and gradually resume all normal activities. She states all of her questions were answered.  She understands our discussion.  She agrees to follow up as needed or to call our office with any concerns.    Marichuy Niño PA-C

## 2025-03-10 ENCOUNTER — TELEPHONE (OUTPATIENT)
Dept: FAMILY MEDICINE | Facility: CLINIC | Age: 40
End: 2025-03-10

## 2025-03-10 NOTE — TELEPHONE ENCOUNTER
Patient Quality Outreach    Patient is due for the following:   Asthma  -  ACT needed      Topic Date Due    Pneumococcal Vaccine (1 of 2 - PCV) Never done       Action(s) Taken:   Patient was scheduled for Physical on 07/11/2025  Patient has upcoming appointment, these items will be addressed at that time.    Type of outreach:    Chart review performed, no outreach needed.    Questions for provider review:    None           Brigitte Cooper, CMA

## 2025-03-11 ENCOUNTER — MYC MEDICAL ADVICE (OUTPATIENT)
Dept: FAMILY MEDICINE | Facility: CLINIC | Age: 40
End: 2025-03-11
Payer: COMMERCIAL

## 2025-03-11 DIAGNOSIS — J30.1 SEASONAL ALLERGIC RHINITIS DUE TO POLLEN: Primary | ICD-10-CM

## 2025-03-13 ENCOUNTER — MYC MEDICAL ADVICE (OUTPATIENT)
Dept: ORTHOPEDICS | Facility: CLINIC | Age: 40
End: 2025-03-13
Payer: COMMERCIAL

## 2025-03-13 NOTE — TELEPHONE ENCOUNTER
Outbound call to patient.    Helped patient schedule an appointment with Karlos Dhillon PA-C for Wednesday March 19, 2025 arrival time of 1640.    Patient was pleased with outcome of call and agrees with above.    Tamara Castillo MA

## 2025-03-18 NOTE — PROGRESS NOTES
Sports Medicine Clinic           ASSESSMENT and PLAN:     Maine was seen today for pain.    Diagnoses and all orders for this visit:    Acute pain of left knee  Acute pain of the left knee, with signs and symptoms concerning for meniscal injury.  X-rays without significant degenerative change, however we did discuss the possibility of chondromalacia, and that even if she has a meniscal injury it may be degenerative, or treated as such.  Proceed with MRI to evaluate for meniscal injury versus chondromalacia as well as start physical therapy, NSAIDs and hinged knee brace.  -     XR Knee Standing AP Bilat [employer] [employer] Bilat Lat Left; Future  -     MR Knee Left w/o Contrast; Future  -     Physical Therapy  Referral; Future  -     Diclofenac 50 mg twice daily for 2 weeks followed by as needed  -     Follow-up after MRI    Return sooner if develops new or worsening symptoms.    Options for treatment and/or follow-up care were reviewed with the patient was actively involved in the decision making process. Patient verbalized understanding and was in agreement with the plan.    Ava Hanson MD, CAM  Primary Care Sports Medicine             SUBJECTIVE       Maine Choe is a 39 year old female presenting to clinic today with a chief complaint of left knee pain, referred by self.    Onset: 4-6 week(s) ago. Reports insidious onset without acute precipitating event.  Location of Pain: left anterior knee around the PF joint, intermittent posterior knee  Rating of Pain at worst: 8/10  Rating of Pain Currently: 2/10  Worsened by: kneeling on  it, stairs, pickle ball, lateral movements, getting in/out of car. Getting up from prolonged sitting,   Better with: ice and ibuprofen.   Treatments tried: ice, ibuprofen  Associated symptoms: aching with bouts of sharp. Throbbing if prolonged.    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 Pickle ball, yoga, gardening, impacted by this.     Hurts to bend and move. Did gain some weight recently after gallbladder was removed. She has a family history of bad OA. No clicking or catching. No locking, but stiffness. Has been taking occasional ibuprofen.     PMH, Medications and Allergies were reviewed and updated as needed.    ROS:  As noted above otherwise negative.    Patient Active Problem List   Diagnosis    Patellar tendonitis    Tennis elbow    Acne vulgaris    Major depressive disorder, recurrent episode, mild    Menstrual migraine without status migrainosus, not intractable    History of abnormal cervical Pap smear    Foreign body of skin of plantar aspect of right foot       Current Outpatient Medications   Medication Sig Dispense Refill    acetaminophen (TYLENOL) 325 MG tablet Take 2 tablets (650 mg) by mouth every 4 hours as needed for mild pain. 50 tablet 0    albuterol (PROVENTIL) (2.5 MG/3ML) 0.083% neb solution Take 2.5 mg by nebulization every 6 hours as needed      apixaban ANTICOAGULANT (ELIQUIS) 5 MG tablet Take 1 tablet (5 mg) by mouth 2 times daily. (Patient not taking: Reported on 3/7/2025) 180 tablet 0    diclofenac (VOLTAREN) 50 MG EC tablet Take 1 tablet (50 mg) by mouth 2 times daily 42 tablet 0    escitalopram (LEXAPRO) 20 MG tablet Take 1 tablet (20 mg) by mouth daily at 2 pm 90 tablet 3    etonogestrel-ethinyl estradiol (ELURYNG) 0.12-0.015 MG/24HR vaginal ring Place 1 each vaginally every 28 days. 3 each 3    senna-docusate (SENOKOT-S/PERICOLACE) 8.6-50 MG tablet Take 1-2 tablets by mouth 2 times daily. 30 tablet 0    spironolactone (ALDACTONE) 50 MG tablet Take 1 tablet (50 mg) by mouth 3 times daily 270 tablet 3            OBJECTIVE:       Vitals: There were no vitals filed for this visit.  BMI: There is no height or weight on file to calculate BMI.    Gen:  Well nourished and in no  acute distress  HEENT: Extraocular movement intact  Neck: Supple  Pulm:  Breathing Comfortably. No increased respiratory effort.  Psych: Euthymic. Appropriately answers questions    MSK:   LEFT KNEE  Inspection:    Normal alignment; no edema, erythema, or ecchymosis present  Palpation:    Tender about the lateral joint line and medial joint line. Remainder of bony and ligamentous landmarks are nontender.    No effusion is present    Patellofemoral crepitus is Absent  Range of Motion:     00 extension to 1000 flexion  Strength:    Quadriceps 5/5 and hamstrings 5/5    Extensor mechanism intact  Special Tests:    Positive: Justin's, Thessaly's    Negative: MCL/valgus stress (0 & 30 deg), LCL/varus stress (0 & 30 deg), Lachman's     Imaging was personally reviewed and interpreted by me.   XRAY left knee (March 22, 2025): No acute bony abnormality.  Slight lateral joint space narrowing bilaterally.

## 2025-03-22 ENCOUNTER — OFFICE VISIT (OUTPATIENT)
Dept: ORTHOPEDICS | Facility: CLINIC | Age: 40
End: 2025-03-22
Payer: COMMERCIAL

## 2025-03-22 ENCOUNTER — ANCILLARY PROCEDURE (OUTPATIENT)
Dept: GENERAL RADIOLOGY | Facility: CLINIC | Age: 40
End: 2025-03-22
Payer: COMMERCIAL

## 2025-03-22 DIAGNOSIS — M25.562 ACUTE PAIN OF LEFT KNEE: ICD-10-CM

## 2025-03-22 DIAGNOSIS — M25.562 ACUTE PAIN OF LEFT KNEE: Primary | ICD-10-CM

## 2025-03-22 PROCEDURE — 73562 X-RAY EXAM OF KNEE 3: CPT | Mod: TC | Performed by: RADIOLOGY

## 2025-03-22 PROCEDURE — 99204 OFFICE O/P NEW MOD 45 MIN: CPT | Mod: 24 | Performed by: STUDENT IN AN ORGANIZED HEALTH CARE EDUCATION/TRAINING PROGRAM

## 2025-03-22 NOTE — LETTER
3/22/2025      Maine Choe  [address]  [place] [address]      Dear Colleague,    Thank you for referring your patient, Maine Choe, to the [facility] [place]. Please see a copy of my visit note below.    Sports Medicine Clinic           ASSESSMENT and PLAN:     Maine was seen today for pain.    Diagnoses and all orders for this visit:    Acute pain of left knee  Acute pain of the left knee, with signs and symptoms concerning for meniscal injury.  X-rays without significant degenerative change, however we did discuss the possibility of chondromalacia, and that even if she has a meniscal injury it may be degenerative, or treated as such.  Proceed with MRI to evaluate for meniscal injury versus chondromalacia as well as start physical therapy, NSAIDs and hinged knee brace.  -     XR Knee Standing AP Bilat [place] Bilat Lat Left; Future  -     MR Knee Left w/o Contrast; Future  -     Physical Therapy  Referral; Future  -     Diclofenac 50 mg twice daily for 2 weeks followed by as needed  -     Follow-up after MRI    Return sooner if develops new or worsening symptoms.    Options for treatment and/or follow-up care were reviewed with the patient was actively involved in the decision making process. Patient verbalized understanding and was in agreement with the plan.    Ava Hanson MD, CAM  Primary Care Sports Medicine             SUBJECTIVE       Maine Choe is a 39 year old female presenting to clinic today with a chief complaint of left knee pain, referred by self.    Onset: 4-6 week(s) ago. Reports insidious onset without acute precipitating event.  Location of Pain: left anterior knee around the PF joint, intermittent posterior knee  Rating of Pain at worst: 8/10  Rating of Pain Currently: 2/10  Worsened by: kneeling on  it, stairs, pickle ball, lateral movements, getting in/out of car. Getting up from prolonged sitting,   Better with: ice and  ibuprofen.   Treatments tried: ice, ibuprofen  Associated symptoms: aching with bouts of sharp. Throbbing if prolonged.    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 Pickle ball, yoga, gardening, impacted by this.     Hurts to bend and move. Did gain some weight recently after gallbladder was removed. She has a family history of bad OA. No clicking or catching. No locking, but stiffness. Has been taking occasional ibuprofen.     PMH, Medications and Allergies were reviewed and updated as needed.    ROS:  As noted above otherwise negative.    Patient Active Problem List   Diagnosis     Patellar tendonitis     Tennis elbow     Acne vulgaris     Major depressive disorder, recurrent episode, mild     Menstrual migraine without status migrainosus, not intractable     History of abnormal cervical Pap smear     Foreign body of skin of plantar aspect of right foot       Current Outpatient Medications   Medication Sig Dispense Refill     acetaminophen (TYLENOL) 325 MG tablet Take 2 tablets (650 mg) by mouth every 4 hours as needed for mild pain. 50 tablet 0     albuterol (PROVENTIL) (2.5 MG/3ML) 0.083% neb solution Take 2.5 mg by nebulization every 6 hours as needed       apixaban ANTICOAGULANT (ELIQUIS) 5 MG tablet Take 1 tablet (5 mg) by mouth 2 times daily. (Patient not taking: Reported on 3/7/2025) 180 tablet 0     diclofenac (VOLTAREN) 50 MG EC tablet Take 1 tablet (50 mg) by mouth 2 times daily 42 tablet 0     escitalopram (LEXAPRO) 20 MG tablet Take 1 tablet (20 mg) by mouth daily at 2 pm 90 tablet 3     etonogestrel-ethinyl estradiol (ELURYNG) 0.12-0.015 MG/24HR vaginal ring Place 1 each vaginally every 28 days. 3 each 3     senna-docusate (SENOKOT-S/PERICOLACE) 8.6-50 MG tablet Take 1-2 tablets by mouth 2 times daily. 30 tablet 0      spironolactone (ALDACTONE) 50 MG tablet Take 1 tablet (50 mg) by mouth 3 times daily 270 tablet 3            OBJECTIVE:       Vitals: There were no vitals filed for this visit.  BMI: There is no height or weight on file to calculate BMI.    Gen:  Well nourished and in no acute distress  HEENT: Extraocular movement intact  Neck: Supple  Pulm:  Breathing Comfortably. No increased respiratory effort.  Psych: Euthymic. Appropriately answers questions    MSK:   LEFT KNEE  Inspection:    Normal alignment; no edema, erythema, or ecchymosis present  Palpation:    Tender about the lateral joint line and medial joint line. Remainder of bony and ligamentous landmarks are nontender.    No effusion is present    Patellofemoral crepitus is Absent  Range of Motion:     00 extension to 1000 flexion  Strength:    Quadriceps 5/5 and hamstrings 5/5    Extensor mechanism intact  Special Tests:    Positive: Justin's, Thessaly's    Negative: MCL/valgus stress (0 & 30 deg), LCL/varus stress (0 & 30 deg), Lachman's     Imaging was personally reviewed and interpreted by me.   XRAY left knee (March 22, 2025): No acute bony abnormality.  Slight lateral joint space narrowing bilaterally.         Again, thank you for allowing me to participate in the care of your patient.        Sincerely,        Ava Hanson MD    Electronically signed

## 2025-03-22 NOTE — PATIENT INSTRUCTIONS
Follow up with PT, referral placed today.  Take diclofenac twice daily for two weeks, followed by as needed up to twice daily. Do not take with other NSAIDs (ibuprofen, Advil, naproxen or Aleve).  Please call [phone] with questions or concerns.    For MRI scheduling:     For scheduling at [facility] ([facility], [facility], [place]), call [phone] or [phone]   For scheduling in the [place] ([facility]) call  [phone] or [phone]      For scheduling in [place] ([facility], [facility]) call [phone] or [phone]      Ava Hanson MD, [facility]  Primary Care Sports Medicine

## 2025-03-29 ENCOUNTER — HOSPITAL ENCOUNTER (OUTPATIENT)
Dept: MRI IMAGING | Facility: CLINIC | Age: 40
Discharge: HOME | End: 2025-03-29
Payer: COMMERCIAL

## 2025-03-29 DIAGNOSIS — M25.562 ACUTE PAIN OF LEFT KNEE: ICD-10-CM

## 2025-03-29 PROCEDURE — 73721 MRI JNT OF LWR EXTRE W/O DYE: CPT | Mod: LT

## 2025-03-29 PROCEDURE — 73721 MRI JNT OF LWR EXTRE W/O DYE: CPT | Mod: 26 | Performed by: RADIOLOGY

## 2025-04-02 ASSESSMENT — ACTIVITIES OF DAILY LIVING (ADL)
GIVING WAY, BUCKLING OR SHIFTING OF KNEE: THE SYMPTOM AFFECTS MY ACTIVITY SLIGHTLY
AS_A_RESULT_OF_YOUR_KNEE_INJURY,_HOW_WOULD_YOU_RATE_YOUR_CURRENT_LEVEL_OF_DAILY_ACTIVITY?: ABNORMAL
SIT WITH YOUR KNEE BENT: ACTIVITY IS SOMEWHAT DIFFICULT
STIFFNESS: THE SYMPTOM AFFECTS MY ACTIVITY SLIGHTLY
HOW_WOULD_YOU_RATE_THE_OVERALL_FUNCTION_OF_YOUR_KNEE_DURING_YOUR_USUAL_DAILY_ACTIVITIES?: ABNORMAL
SWELLING: THE SYMPTOM AFFECTS MY ACTIVITY MODERATELY
STAND: ACTIVITY IS MINIMALLY DIFFICULT
SQUAT: ACTIVITY IS SOMEWHAT DIFFICULT
KNEE_ACTIVITY_OF_DAILY_LIVING_SUM: 36
RISE FROM A CHAIR: ACTIVITY IS SOMEWHAT DIFFICULT
PAIN: THE SYMPTOM AFFECTS MY ACTIVITY MODERATELY
LIMPING: THE SYMPTOM AFFECTS MY ACTIVITY MODERATELY
PAIN: THE SYMPTOM AFFECTS MY ACTIVITY MODERATELY
SQUAT: ACTIVITY IS SOMEWHAT DIFFICULT
RISE FROM A CHAIR: ACTIVITY IS SOMEWHAT DIFFICULT
RAW_SCORE: 36
LIMPING: THE SYMPTOM AFFECTS MY ACTIVITY MODERATELY
SWELLING: THE SYMPTOM AFFECTS MY ACTIVITY MODERATELY
WALK: ACTIVITY IS SOMEWHAT DIFFICULT
SIT WITH YOUR KNEE BENT: ACTIVITY IS SOMEWHAT DIFFICULT
KNEE_ACTIVITY_OF_DAILY_LIVING_SCORE: 51.43
WEAKNESS: THE SYMPTOM AFFECTS MY ACTIVITY SLIGHTLY
STAND: ACTIVITY IS MINIMALLY DIFFICULT
GO UP STAIRS: ACTIVITY IS FAIRLY DIFFICULT
STIFFNESS: THE SYMPTOM AFFECTS MY ACTIVITY SLIGHTLY
HOW_WOULD_YOU_RATE_THE_CURRENT_FUNCTION_OF_YOUR_KNEE_DURING_YOUR_USUAL_DAILY_ACTIVITIES_ON_A_SCALE_FROM_0_TO_100_WITH_100_BEING_YOUR_LEVEL_OF_KNEE_FUNCTION_PRIOR_TO_YOUR_INJURY_AND_0_BEING_THE_INABILITY_TO_PERFORM_ANY_OF_YOUR_USUAL_DAILY_ACTIVITIES?: 70
GIVING WAY, BUCKLING OR SHIFTING OF KNEE: THE SYMPTOM AFFECTS MY ACTIVITY SLIGHTLY
HOW_WOULD_YOU_RATE_THE_CURRENT_FUNCTION_OF_YOUR_KNEE_DURING_YOUR_USUAL_DAILY_ACTIVITIES_ON_A_SCALE_FROM_0_TO_100_WITH_100_BEING_YOUR_LEVEL_OF_KNEE_FUNCTION_PRIOR_TO_YOUR_INJURY_AND_0_BEING_THE_INABILITY_TO_PERFORM_ANY_OF_YOUR_USUAL_DAILY_ACTIVITIES?: 70
AS_A_RESULT_OF_YOUR_KNEE_INJURY,_HOW_WOULD_YOU_RATE_YOUR_CURRENT_LEVEL_OF_DAILY_ACTIVITY?: ABNORMAL
GO DOWN STAIRS: ACTIVITY IS FAIRLY DIFFICULT
KNEEL ON THE FRONT OF YOUR KNEE: ACTIVITY IS VERY DIFFICULT
GO DOWN STAIRS: ACTIVITY IS FAIRLY DIFFICULT
WALK: ACTIVITY IS SOMEWHAT DIFFICULT
PLEASE_INDICATE_YOR_PRIMARY_REASON_FOR_REFERRAL_TO_THERAPY:: KNEE
WEAKNESS: THE SYMPTOM AFFECTS MY ACTIVITY SLIGHTLY
GO UP STAIRS: ACTIVITY IS FAIRLY DIFFICULT
HOW_WOULD_YOU_RATE_THE_OVERALL_FUNCTION_OF_YOUR_KNEE_DURING_YOUR_USUAL_DAILY_ACTIVITIES?: ABNORMAL
KNEEL ON THE FRONT OF YOUR KNEE: ACTIVITY IS VERY DIFFICULT

## 2025-04-06 ENCOUNTER — MYC MEDICAL ADVICE (OUTPATIENT)
Dept: FAMILY MEDICINE | Facility: CLINIC | Age: 40
End: 2025-04-06
Payer: COMMERCIAL

## 2025-04-07 ENCOUNTER — THERAPY VISIT (OUTPATIENT)
Dept: PHYSICAL THERAPY | Facility: CLINIC | Age: 40
End: 2025-04-07
Payer: COMMERCIAL

## 2025-04-07 DIAGNOSIS — M25.562 ACUTE PAIN OF LEFT KNEE: ICD-10-CM

## 2025-04-07 PROCEDURE — 97010 HOT OR COLD PACKS THERAPY: CPT | Mod: GP | Performed by: PHYSICAL THERAPIST

## 2025-04-07 PROCEDURE — 97110 THERAPEUTIC EXERCISES: CPT | Mod: GP | Performed by: PHYSICAL THERAPIST

## 2025-04-07 PROCEDURE — 97161 PT EVAL LOW COMPLEX 20 MIN: CPT | Mod: GP | Performed by: PHYSICAL THERAPIST

## 2025-04-07 NOTE — PROGRESS NOTES
PHYSICAL THERAPY EVALUATION  Type of Visit: Evaluation              Subjective     Pt describes left knee pain that began insidiously about 2 months ago.  Locates the pain in the anterior knee.  Felt with kneeling on it, squatting, lateral movements, rising from sitting, stairs, while playing pickleball.  Right knee also is painful.  Recent MRI of left knee shows full thickness cartilage loss on the lat femoral condyle and PTF chondromalacia.          Presenting condition or subjective complaint: Knee pain  Date of onset:      Relevant medical history:     Dates & types of surgery:      Prior diagnostic imaging/testing results: MRI; X-ray     Prior therapy history for the same diagnosis, illness or injury: No      Prior Level of Function  Transfers: Independent  Ambulation: Independent  ADL: Independent  IADL:     Living Environment  Social support: With a significant other or spouse   Type of home: House; 2-story   Stairs to enter the home: Yes 2 Is there a railing: No     Ramp: No   Stairs inside the home: Yes 13 Is there a railing: Yes     Help at home: None  Equipment owned:       Employment: Yes Therapist  Hobbies/Interests: Gardening, yoga, pickleball, walking, running, kayak, camping    Patient goals for therapy: Kneel, run, squat without pain, jump without pain    Pain assessment: See objective evaluation for additional pain details     Objective   KNEE EVALUATION  PAIN: Pain Level at Rest: 1/10  Pain Level with Use: 8/10  Pain Location: anterior knee  Pain is Exacerbated By: kneeling, stairs, squatting  INTEGUMENTARY (edema, incisions): WNL  POSTURE: WNL  GAIT:  Weightbearing Status: WBAT  Assistive Device(s): None  Gait Deviations: WNL  BALANCE/PROPRIOCEPTION:   WEIGHTBEARING ALIGNMENT: Knee WB Alignment:Genu valgus L, Genu valgus R  NON-WEIGHTBEARING ALIGNMENT:   ROM: AROM WNL  PROM WNL  Slight tightness at end range flexion    STRENGTH: WNL  FLEXIBILITY:   SPECIAL TESTS:    Left Right   Lizbethey's  (Meniscus)     Justin's (Meniscus) Negative  Negative    Odell's (ITB/TFL)     Patellar Apprehension Test Negative  Negative    Patella Tracking Lateral displacement Lateral displacement   Ligamentous Stability Negative  Negative    Anterior Drawer (ACL)     Posterior Drawer (PCL)     Prone Dial Test at 30 Deg and 90 Deg (PCL/PLC)     Valgus Stress Testing at 0 Deg and 30 Deg     Varus Stress Testing at 0 Deg and 30 Deg        FUNCTIONAL TESTS: Double Leg Squat: Anterior knee translation and with pain  Single Leg Squat: Anterior knee translation, Knee valgus, Hip internal rotation, Improper use of glutes/hips, and with pain  PALPATION:  patellar border pain, med jt line, lat tilt of the patella bilat   JOINT MOBILITY:  mod PTF crepitus with patellar glides    Assessment & Plan   CLINICAL IMPRESSIONS  Medical Diagnosis: Acute pain of left knee    Treatment Diagnosis: 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  The following significant findings have been identified: Pain and Decreased strength. These impairments interfere with their ability to perform recreational activities, household chores, household mobility, and community mobility as compared to previous level of function.     Clinical Decision Making (Complexity):  Clinical Presentation: Stable/Uncomplicated  Clinical Presentation Rationale: based on medical and personal factors listed in PT evaluation  Clinical Decision Making (Complexity): Low complexity    PLAN OF CARE  Treatment Interventions:  Modalities: Cryotherapy  Interventions: Therapeutic Exercise    Long Term Goals     PT Goal 1  Goal Identifier: Stairs  Goal Description: Able to asc and desc stairs painfree  Rationale: to maximize safety and independence with performance of ADLs and functional tasks  Target Date: 05/19/25  PT Goal 2  Goal Identifier: Pickleball  Goal Description: Able to play pickleball for  an hour with knee pain of 2/10 or less  Target Date: 06/02/25      Frequency of Treatment: Every other week  Duration of Treatment: 8 weeks    Recommended Referrals to Other Professionals:   Education Assessment:        Risks and benefits of evaluation/treatment have been explained.   Patient/Family/caregiver agrees with Plan of Care.     Evaluation Time:     PT Eval, Low Complexity Minutes (97888): 12       Signing Clinician: Reese Reveles PT

## 2025-04-12 ENCOUNTER — MYC MEDICAL ADVICE (OUTPATIENT)
Dept: FAMILY MEDICINE | Facility: CLINIC | Age: 40
End: 2025-04-12
Payer: COMMERCIAL

## 2025-04-16 NOTE — PROGRESS NOTES
Sports Medicine Clinic           ASSESSMENT and PLAN:     Maine was seen today for pain.    Diagnoses and all orders for this visit:    Primary osteoarthritis of left knee  Primary osteoarthritis of right knee  Mild degenerative changes below the knees bilaterally, consistent with early osteoarthritis.  The left knee with a area of full-thickness cartilage loss on the lateral femoral condyle as well as blunting of the lateral meniscus, and on the right with mild tricompartmental arthritis on x-ray.  Has had some mild improvement with conservative therapy including physical therapy, taping, bracing.  Is interested in continue brace for the right, suspect helpful on the left, and this is appropriate was given today.  We again discussed treatment options including injections, both corticosteroid and hyaluronic acid.  She is interested in avoiding corticosteroid injections as long as possible, and would like to try hyaluronic acid which was previously approved for the  left knee.  She is also interested in going through prior authorization for a HA injection on the  right.  She tolerated a left Synvisc 1 injection well today, and we will plan to have her follow-up in approximately 2 weeks for a Synvisc 1 injection on the right assuming it is approved by her insurance.  -     (PRE-AUTH REQUEST) 48 mg hylan (SYNVISC ONE) injection 48 mg/6mL-ONCE  -     Ankle/Knee Bracing Supplies Order Hinged Knee Brace (XXL); Right  -     Large Joint Injection/Arthocentesis: L knee joint    Return sooner if develops new or worsening symptoms.    Options for treatment and/or follow-up care were reviewed with the patient was actively involved in the decision making process. Patient verbalized understanding and was in agreement with the plan.    Ava Hanson MD, [facility]  Primary Care Sports Medicine    Large Joint Injection/Arthocentesis: L knee joint    Date/Time: 5/3/2025 11:12 AM    Performed by: Ava Hanson MD  Authorized by:  Ava Hanson MD    Indications:  Pain  Needle Size:  21 G  Guidance: landmark guided    Approach:  Anterolateral  Location:  Knee      Medications:  3 mL lidocaine 1 %; 48 mg hylan 48 MG/6ML  Outcome:  Tolerated well, no immediate complications  Procedure discussed: discussed risks, benefits, and alternatives    Consent Given by:  Patient  Timeout: timeout called immediately prior to procedure    Prep: patient was prepped and draped in usual sterile fashion                 SUBJECTIVE       Maine Choe is a 39 year old female presenting to clinic today with a chief complaint of right knee pain, referred by self.    Onset: 1.5 years(s) ago. Reports insidious onset without acute precipitating event.  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  Continues to be sore, although the brace, icing, NSAIDs have been helping.     Right knee has been more consistent for a longer period of time, but was never as bad as the left knee has been in the last several months.  Pain is vague, sometimes in the front, sometimes on the inside of the knee.  Similar things that bother the left bothers her right.  Wondering if a hinged knee brace which has been helpful in the left trapezius on the right.  Also interested in injections on the right.    PMH, Medications and Allergies were reviewed and updated as needed.    ROS:  As noted above otherwise negative.    Patient Active Problem List   Diagnosis    Patellar  tendonitis    Tennis elbow    Acne vulgaris    Major depressive disorder, recurrent episode, mild    Menstrual migraine without status migrainosus, not intractable    History of abnormal cervical Pap smear    Foreign body of skin of plantar aspect of right foot    Acute pain of left knee       Current Outpatient Medications   Medication Sig Dispense Refill    acetaminophen (TYLENOL) 325 MG tablet Take 2 tablets (650 mg) by mouth every 4 hours as needed for mild pain. 50 tablet 0    albuterol (PROVENTIL) (2.5 MG/3ML) 0.083% neb solution Take 2.5 mg by nebulization every 6 hours as needed      apixaban ANTICOAGULANT (ELIQUIS) 5 MG tablet Take 1 tablet (5 mg) by mouth 2 times daily. (Patient not taking: Reported on 3/7/2025) 180 tablet 0    diclofenac (VOLTAREN) 50 MG EC tablet Take one tab twice daily by mouth with meals for two weeks followed by as needed up to twice daily. 60 tablet 0    diclofenac (VOLTAREN) 50 MG EC tablet Take 1 tablet (50 mg) by mouth 2 times daily 42 tablet 0    escitalopram (LEXAPRO) 20 MG tablet Take 1 tablet (20 mg) by mouth daily at 2 pm 90 tablet 3    etonogestrel-ethinyl estradiol (ELURYNG) 0.12-0.015 MG/24HR vaginal ring Place 1 each vaginally every 28 days. 3 each 3    senna-docusate (SENOKOT-S/PERICOLACE) 8.6-50 MG tablet Take 1-2 tablets by mouth 2 times daily. 30 tablet 0    spironolactone (ALDACTONE) 50 MG tablet Take 1 tablet (50 mg) by mouth 3 times daily 270 tablet 3            OBJECTIVE:       Vitals: There were no vitals filed for this visit.  BMI: There is no height or weight on file to calculate BMI.    Gen:  Well nourished and in no acute distress  HEENT: Extraocular movement intact  Neck: Supple  Pulm:  Breathing Comfortably. No increased respiratory effort.  Psych: Euthymic. Appropriately answers questions    MSK:   RIGHT KNEE  Inspection:    Mild genu valgum, no edema, erythema, or ecchymosis present  Palpation:    Tender about the lateral patellar facet, medial patellar  facet, and medial joint line. Remainder of bony and ligamentous landmarks are nontender.    No effusion is present    Patellofemoral crepitus is Absent  Range of Motion:     00 extension to 1100 flexion  Strength:    Quadriceps 5/5 and hamstrings 5/5    Extensor mechanism intact  Special Tests:    Positive: Justin's    Imaging was personally reviewed and interpreted by me.   EXAM: XR KNEE STANDING AP SUNRISE BILAT LAT LEFT  LOCATION: [facility] [place]  DATE: 3/22/2025     INDICATION: Acute pain of left knee.  COMPARISON: 12/12/2023 right knee radiograph.                                                                      IMPRESSION:   LEFT KNEE: There is a joint effusion. Joint spaces are maintained. No evidence of an acute fracture.     RIGHT KNEE: Views of the right knee show mild tricompartmental arthrosis.

## 2025-04-22 ENCOUNTER — MYC MEDICAL ADVICE (OUTPATIENT)
Dept: ORTHOPEDICS | Facility: CLINIC | Age: 40
End: 2025-04-22
Payer: COMMERCIAL

## 2025-04-23 ENCOUNTER — TELEPHONE (OUTPATIENT)
Dept: ORTHOPEDICS | Facility: CLINIC | Age: 40
End: 2025-04-23
Payer: COMMERCIAL

## 2025-04-23 DIAGNOSIS — M17.12 PRIMARY OSTEOARTHRITIS OF LEFT KNEE: Primary | ICD-10-CM

## 2025-04-23 NOTE — TELEPHONE ENCOUNTER
Patient scheduled for appointment on 5/3/25 @ [facility] for discussion of viscosupplementation injection vs steroid injection of left knee.        No previous injection completed     Patient has failed 3 month trial of Pharmacologic Approach (e.g., topical NSAIDs, oral NSAIDs with or without oral proton pump inhibitors, VILLALOBOS-2 inhibitors, topical capsaicin, acetaminophen, tramadol, duloxetine, etc.):  No     Patient has completed 3 month trial of Non-Pharmacologic treatments (i.e., physical, psychosocial, or mind-body approach (e.g., exercise-land based or aquatic, physical therapy, denae chi, yoga, weight management, cognitive behavioral therapy, knee brace or cane, etc).  No - in PT currently, 1 visit. Uses knee brace    Has patient had prior reaction to Synvisc/SynviscOne or any alternative HA product?: No    Prior authorization referral for SynviscOne injection pended.    Please advise

## 2025-05-01 ENCOUNTER — THERAPY VISIT (OUTPATIENT)
Dept: PHYSICAL THERAPY | Facility: CLINIC | Age: 40
End: 2025-05-01
Payer: COMMERCIAL

## 2025-05-01 DIAGNOSIS — M25.562 ACUTE PAIN OF LEFT KNEE: Primary | ICD-10-CM

## 2025-05-03 ENCOUNTER — OFFICE VISIT (OUTPATIENT)
Dept: ORTHOPEDICS | Facility: CLINIC | Age: 40
End: 2025-05-03
Payer: COMMERCIAL

## 2025-05-03 DIAGNOSIS — M17.11 PRIMARY OSTEOARTHRITIS OF RIGHT KNEE: ICD-10-CM

## 2025-05-03 DIAGNOSIS — M17.12 PRIMARY OSTEOARTHRITIS OF LEFT KNEE: Primary | ICD-10-CM

## 2025-05-03 RX ORDER — LIDOCAINE HYDROCHLORIDE 10 MG/ML
3 INJECTION, SOLUTION INFILTRATION; PERINEURAL
Status: COMPLETED | OUTPATIENT
Start: 2025-05-03 | End: 2025-05-03

## 2025-05-03 RX ADMIN — LIDOCAINE HYDROCHLORIDE 3 ML: 10 INJECTION, SOLUTION INFILTRATION; PERINEURAL at 11:12

## 2025-05-03 NOTE — LETTER
5/3/2025      Maine Choe  [address]  [place] [address]      Dear Colleague,    Thank you for referring your patient, Maine Choe, to the [facility] [place]. Please see a copy of my visit note below.    Sports Medicine Clinic           ASSESSMENT and PLAN:     Maine was seen today for pain.    Diagnoses and all orders for this visit:    Primary osteoarthritis of left knee  Primary osteoarthritis of right knee  Mild degenerative changes below the knees bilaterally, consistent with early osteoarthritis.  The left knee with a area of full-thickness cartilage loss on the lateral femoral condyle as well as blunting of the lateral meniscus, and on the right with mild tricompartmental arthritis on x-ray.  Has had some mild improvement with conservative therapy including physical therapy, taping, bracing.  Is interested in continue brace for the right, suspect helpful on the left, and this is appropriate was given today.  We again discussed treatment options including injections, both corticosteroid and hyaluronic acid.  She is interested in avoiding corticosteroid injections as long as possible, and would like to try hyaluronic acid which was previously approved for the  left knee.  She is also interested in going through prior authorization for a HA injection on the  right.  She tolerated a left Synvisc 1 injection well today, and we will plan to have her follow-up in approximately 2 weeks for a Synvisc 1 injection on the right assuming it is approved by her insurance.  -     (PRE-AUTH REQUEST) 48 mg hylan (SYNVISC ONE) injection 48 mg/6mL-ONCE  -     Ankle/Knee Bracing Supplies Order Hinged Knee Brace (XXL); Right  -     Large Joint Injection/Arthocentesis: L knee joint    Return sooner if develops new or worsening symptoms.    Options for treatment and/or follow-up care were reviewed with the patient was actively involved in the decision making process. Patient verbalized  understanding and was in agreement with the plan.    Ava Hanson MD, CAQSM  Primary Care Sports Medicine    Large Joint Injection/Arthocentesis: L knee joint    Date/Time: 5/3/2025 11:12 AM    Performed by: Ava Hanson MD  Authorized by: Ava Hanson MD    Indications:  Pain  Needle Size:  21 G  Guidance: landmark guided    Approach:  Anterolateral  Location:  Knee      Medications:  3 mL lidocaine 1 %; 48 mg hylan 48 MG/6ML  Outcome:  Tolerated well, no immediate complications  Procedure discussed: discussed risks, benefits, and alternatives    Consent Given by:  Patient  Timeout: timeout called immediately prior to procedure    Prep: patient was prepped and draped in usual sterile fashion                 SUBJECTIVE       Maine Choe is a 39 year old female presenting to clinic today with a chief complaint of right knee pain, referred by self.    Onset: 1.5 years(s) ago. Reports insidious onset without acute precipitating event.  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  Continues to be sore, although the brace, icing, NSAIDs have been helping.     Right knee has been more consistent for a longer period of time, but was never as bad as the left knee has been in the last several months.  Pain is vague, sometimes in the front, sometimes on the inside of the knee.  Similar things that bother the left bothers her right.  Wondering if a hinged knee brace which has been helpful in the  left trapezius on the right.  Also interested in injections on the right.    PMH, Medications and Allergies were reviewed and updated as needed.    ROS:  As noted above otherwise negative.    Patient Active Problem List   Diagnosis     Patellar tendonitis     Tennis elbow     Acne vulgaris     Major depressive disorder, recurrent episode, mild     Menstrual migraine without status migrainosus, not intractable     History of abnormal cervical Pap smear     Foreign body of skin of plantar aspect of right foot     Acute pain of left knee       Current Outpatient Medications   Medication Sig Dispense Refill     acetaminophen (TYLENOL) 325 MG tablet Take 2 tablets (650 mg) by mouth every 4 hours as needed for mild pain. 50 tablet 0     albuterol (PROVENTIL) (2.5 MG/3ML) 0.083% neb solution Take 2.5 mg by nebulization every 6 hours as needed       apixaban ANTICOAGULANT (ELIQUIS) 5 MG tablet Take 1 tablet (5 mg) by mouth 2 times daily. (Patient not taking: Reported on 3/7/2025) 180 tablet 0     diclofenac (VOLTAREN) 50 MG EC tablet Take one tab twice daily by mouth with meals for two weeks followed by as needed up to twice daily. 60 tablet 0     diclofenac (VOLTAREN) 50 MG EC tablet Take 1 tablet (50 mg) by mouth 2 times daily 42 tablet 0     escitalopram (LEXAPRO) 20 MG tablet Take 1 tablet (20 mg) by mouth daily at 2 pm 90 tablet 3     etonogestrel-ethinyl estradiol (ELURYNG) 0.12-0.015 MG/24HR vaginal ring Place 1 each vaginally every 28 days. 3 each 3     senna-docusate (SENOKOT-S/PERICOLACE) 8.6-50 MG tablet Take 1-2 tablets by mouth 2 times daily. 30 tablet 0     spironolactone (ALDACTONE) 50 MG tablet Take 1 tablet (50 mg) by mouth 3 times daily 270 tablet 3            OBJECTIVE:       Vitals: There were no vitals filed for this visit.  BMI: There is no height or weight on file to calculate BMI.    Gen:  Well nourished and in no acute distress  HEENT: Extraocular movement intact  Neck: Supple  Pulm:  Breathing  Comfortably. No increased respiratory effort.  Psych: Euthymic. Appropriately answers questions    MSK:   RIGHT KNEE  Inspection:    Mild genu valgum, no edema, erythema, or ecchymosis present  Palpation:    Tender about the lateral patellar facet, medial patellar facet, and medial joint line. Remainder of bony and ligamentous landmarks are nontender.    No effusion is present    Patellofemoral crepitus is Absent  Range of Motion:     00 extension to 1100 flexion  Strength:    Quadriceps 5/5 and hamstrings 5/5    Extensor mechanism intact  Special Tests:    Positive: Justin's    Imaging was personally reviewed and interpreted by me.   EXAM: XR KNEE STANDING AP SUNRISE BILAT LAT LEFT  LOCATION: [facility] [place]  DATE: 3/22/2025     INDICATION: Acute pain of left knee.  COMPARISON: 12/12/2023 right knee radiograph.                                                                      IMPRESSION:   LEFT KNEE: There is a joint effusion. Joint spaces are maintained. No evidence of an acute fracture.     RIGHT KNEE: Views of the right knee show mild tricompartmental arthrosis.        Again, thank you for allowing me to participate in the care of your patient.        Sincerely,        Ava Hanson MD    Electronically signed

## 2025-05-05 NOTE — PROGRESS NOTES
[facility]  Sports Medicine Clinic  Clinics and Surgery Center  PROCEDURE NOTE    Reason for visit: Right knee Synvisc one injection     History: Right Knee OA     Prior Imaging: Xray 3/22/25    Last injection: 5/3/2025 - left knee Syvisc with improvement      Large Joint Injection/Arthocentesis: R knee joint    Date/Time: 5/17/2025 9:13 AM    Performed by: Ava Hanson MD  Authorized by: Ava Hanson MD    Indications:  Pain and osteoarthritis  Needle Size:  21 G  Guidance: landmark guided    Approach:  Anterolateral  Location:  Knee      Medications:  3 mL lidocaine 1 %; 48 mg hylan 48 MG/6ML  Outcome:  Tolerated well, no immediate complications  Procedure discussed: discussed risks, benefits, and alternatives    Consent Given by:  Patient  Timeout: timeout called immediately prior to procedure    Prep: patient was prepped and draped in usual sterile fashion

## 2025-05-14 SDOH — HEALTH STABILITY: PHYSICAL HEALTH: ON AVERAGE, HOW MANY DAYS PER WEEK DO YOU ENGAGE IN MODERATE TO STRENUOUS EXERCISE (LIKE A BRISK WALK)?: 3 DAYS

## 2025-05-14 SDOH — HEALTH STABILITY: PHYSICAL HEALTH: ON AVERAGE, HOW MANY MINUTES DO YOU ENGAGE IN EXERCISE AT THIS LEVEL?: 30 MIN

## 2025-05-17 ENCOUNTER — OFFICE VISIT (OUTPATIENT)
Dept: ORTHOPEDICS | Facility: CLINIC | Age: 40
End: 2025-05-17
Payer: COMMERCIAL

## 2025-05-17 DIAGNOSIS — M17.11 PRIMARY OSTEOARTHRITIS OF RIGHT KNEE: Primary | ICD-10-CM

## 2025-05-17 PROCEDURE — 20610 DRAIN/INJ JOINT/BURSA W/O US: CPT | Mod: RT | Performed by: STUDENT IN AN ORGANIZED HEALTH CARE EDUCATION/TRAINING PROGRAM

## 2025-05-17 RX ORDER — LIDOCAINE HYDROCHLORIDE 10 MG/ML
3 INJECTION, SOLUTION INFILTRATION; PERINEURAL
Status: COMPLETED | OUTPATIENT
Start: 2025-05-17 | End: 2025-05-17

## 2025-05-17 RX ADMIN — LIDOCAINE HYDROCHLORIDE 3 ML: 10 INJECTION, SOLUTION INFILTRATION; PERINEURAL at 09:13

## 2025-05-17 NOTE — LETTER
5/17/2025      Maine Choe  [address]  [place] [address]      Dear Colleague,    Thank you for referring your patient, Maine Choe, to the [facility] SP[facility]TS [facility]IC[facility] CLIN[facility] [place]. Please see a copy of my visit note below.    [facility]  Sports Medicine Clinic  Clinics and Surgery Center  PROCEDURE NOTE    Reason for visit: Right knee Synvisc one injection     History: Right Knee OA     Prior Imaging: Xray 3/22/25    Last injection: 5/3/2025 - left knee Syvisc with improvement      Large Joint Injection/Arthocentesis: R knee joint    Date/Time: 5/17/2025 9:13 AM    Performed by: Ava Hanson MD  Authorized by: Ava Hanson MD    Indications:  Pain and osteoarthritis  Needle Size:  21 G  Guidance: landmark guided    Approach:  Anterolateral  Location:  Knee      Medications:  3 mL lidocaine 1 %; 48 mg hylan 48 MG/6ML  Outcome:  Tolerated well, no immediate complications  Procedure discussed: discussed risks, benefits, and alternatives    Consent Given by:  Patient  Timeout: timeout called immediately prior to procedure    Prep: patient was prepped and draped in usual sterile fashion         Again, thank you for allowing me to participate in the care of your patient.        Sincerely,        Ava Hanson MD    Electronically signed

## 2025-05-29 ENCOUNTER — THERAPY VISIT (OUTPATIENT)
Dept: PHYSICAL THERAPY | Facility: CLINIC | Age: 40
End: 2025-05-29
Payer: COMMERCIAL

## 2025-05-29 DIAGNOSIS — M25.562 ACUTE PAIN OF LEFT KNEE: Primary | ICD-10-CM

## 2025-05-29 NOTE — PROGRESS NOTES
05/29/25 0500   Appointment Info   Signing clinician's name / credentials Reese Reveles PT   Total/Authorized Visits 4 (ET)   Visits Used 4   Medical Diagnosis Acute pain of left knee   PT Tx Diagnosis Left knee lat femoral chondral loss with PTFC   Progress Note/Certification   Therapy Frequency Every other week   Predicted Duration 8 weeks   GOALS   PT Goals 2   PT Goal 1   Goal Identifier Stairs   Goal Description Able to asc and desc stairs painfree   Rationale to maximize safety and independence with performance of ADLs and functional tasks   Goal Progress Slightly better.  Up to 5/10 PL.  Extend goal.   Target Date 06/23/25   PT Goal 2   Goal Identifier Pickleball   Goal Description Able to play pickleball for an hour with knee pain of 2/10 or less   Goal Progress Has not played yet d/t knee.   Target Date 06/02/25   Subjective Report   Subjective Report Taping helped.  Had nearly no knee pain while wearing tape.  Received a second gel injection in the left knee 1-2 weeks ago and reports no improvement this week.   Objective Measures   Objective Measures Objective Measure 1   Objective Measure 1   Objective Measure Lat patellar tracking taping for left knee helped.  Pt to trial wearing tape x 2 days.   PT Modalities   PT Modalities Cryotherapy   Cryotherapy   Cryotherapy Minutes (31819) 10   Ice -Type Pack   Duration 10 min   Location Left knee   Treatment Interventions (PT)   Interventions Therapeutic Procedure/Exercise   Therapeutic Procedure/Exercise   Therapeutic Procedures: strength, endurance, ROM, flexibility minutes (87100) 40   Therapeutic Procedures Ther Proc 2;Ther Proc 3;Ther Proc 4;Ther Proc 5;Ther Proc 6;Ther Proc 7;Ther Proc 8;Ther Proc 9;Ther Proc 10   Ther Proc 1 Bike (SH=5)   Ther Proc 1 - Details 4 min L1   Ther Proc 2 Bridging   Ther Proc 2 - Details 2x15   Ther Proc 3 SLR flex and abd   Ther Proc 3 - Details 2x15 AG bilat   Ther Proc 4 Clamshells   Ther Proc 4 - Details 2x15 bilat  "  Ther Proc 5 Functional knee ext   Ther Proc 5 - Details 20x black bilat   Ther Proc 6 Seated HS curls   Ther Proc 6 - Details 2x15 red bilat   Ther Proc 7 Leg press (SH=3)   Ther Proc 7 - Details 2x15 65#   Ther Proc 8 Patellar taping: left knee   Ther Proc 8 - Details Lat glide correction:  reduced left knee pain from 5/10 to 0-1/10 on 4\" step down   Skilled Intervention Verbal, visual cueing   Plan   Updates to plan of care Pt to wear patellar tape on left knee for next 2 days   Plan for next session Pt will possibly try the patellar tracking brace and continue with the HEP.  f/u with MD if not improving.         DISCHARGE  Reason for Discharge: Pt has completed 4 visits in PT and is independent with the HEP.    Equipment Issued: taping, advised pt to try a patellar tracking brace.    Discharge Plan: Patient to continue home program.    Referring Provider:  Ava Hanson   "

## 2025-06-09 ENCOUNTER — OFFICE VISIT (OUTPATIENT)
Dept: OBGYN | Facility: CLINIC | Age: 40
End: 2025-06-09
Payer: COMMERCIAL

## 2025-06-09 VITALS — WEIGHT: 281 LBS | SYSTOLIC BLOOD PRESSURE: 120 MMHG | BODY MASS INDEX: 45.35 KG/M2 | DIASTOLIC BLOOD PRESSURE: 78 MMHG

## 2025-06-09 DIAGNOSIS — Z86.718 HISTORY OF VENOUS THROMBOEMBOLISM: ICD-10-CM

## 2025-06-09 DIAGNOSIS — N95.1 PERIMENOPAUSE: ICD-10-CM

## 2025-06-09 DIAGNOSIS — N95.1 MENOPAUSAL SYNDROME (HOT FLASHES): Primary | ICD-10-CM

## 2025-06-09 PROCEDURE — 99204 OFFICE O/P NEW MOD 45 MIN: CPT | Performed by: OBSTETRICS & GYNECOLOGY

## 2025-06-09 PROCEDURE — G2211 COMPLEX E/M VISIT ADD ON: HCPCS | Performed by: OBSTETRICS & GYNECOLOGY

## 2025-06-09 PROCEDURE — 3074F SYST BP LT 130 MM HG: CPT | Performed by: OBSTETRICS & GYNECOLOGY

## 2025-06-09 PROCEDURE — 3078F DIAST BP <80 MM HG: CPT | Performed by: OBSTETRICS & GYNECOLOGY

## 2025-06-09 NOTE — NURSING NOTE
"Chief Complaint   Patient presents with    perimenopausal symptoms       Initial /78 (BP Location: Left arm, Patient Position: Chair, Cuff Size: Adult Large)   Wt 127.5 kg (281 lb)   LMP 2025 (Exact Date)   BMI 45.35 kg/m   Estimated body mass index is 45.35 kg/m  as calculated from the following:    Height as of 24: 1.676 m (5' 6\").    Weight as of this encounter: 127.5 kg (281 lb).  BP completed using cuff size: large    Questioned patient about current smoking habits.  Pt. has never smoked.          The following HM Due: NONE  Cyndi Sotomayor CMA           "

## 2025-06-09 NOTE — PROGRESS NOTES
SUBJECTIVE:                                                   CC:  Patient presents with:  perimenopausal symptoms      HPI:  Maine Choe is a 40 year old      History of Present Illness-  Maine Choe, 40 years old, female    - Experiencing increased irritability, changes in sweat odor, dry skin, and night sweats.  - Reports thinning hair.  - Night sweats occur several times a week, causing her to wake up in the middle of the night.  - No daytime hot flashes reported.  - History of a blood clot in the liver discovered in November, treated with Eliquis for 3 months, completed in February. (Discovered incidentally on a work up for gallbladder)  - Previously experienced chest pain, which was related to gallbladder issues.  - Currently using a hormonal ring for menstrual management, not for contraception due to partner's infertility.       39 year old woman with incidentally discovered branch portal vein thrombosis with cholelithiasis (and associated inflammation) provoked this abdominal VTE given anatomic location.     Gyn History:  Patient's last menstrual period was 2025 (exact date).       Using the ring for contraception.    Last 3 Pap and HPV Results:       Latest Ref Rng & Units 2024     8:53 AM   PAP / HPV   PAP  Negative for Intraepithelial Lesion or Malignancy (NILM)    HPV 16 DNA Negative Negative    HPV 18 DNA Negative Negative    Other HR HPV Negative Negative        PMH, PSH, Soc Hx, Fam Hx, Meds, and allergies reviewed in Epic.    OBJECTIVE:     /78 (BP Location: Left arm, Patient Position: Chair, Cuff Size: Adult Large)   Wt 127.5 kg (281 lb)   LMP 2025 (Exact Date)   BMI 45.35 kg/m      Gen: Healthy appearing obese female, no acute distress, comfortable  Psych: mentation appears normal and affect bright  : deferred    ASSESSMENT/PLAN:                                                      1. Menopausal syndrome (hot flashes) (Primary)    2.  Perimenopause    3. History of venous thromboembolism     Assessment & Plan  Perimenopausal Symptoms  - Symptoms consistent with perimenopause, including night sweats, irritability, dry skin, and thinning hair. Hormonal fluctuations typical of this stage.  - Discuss options with hematologist regarding risk of blood clot with hormone therapy. Consider progesterone or non-hormonal therapies like gabapentin or Paxil for symptom management. Patient to reach out to hematologist and follow up with a message or in-person consultation to decide on treatment.  - Risks and side effects: Discussed potential increased risk of breast cancer with hormone therapy, though current progesterone option may not increase risk. Emphasized importance of staying up to date with mammograms.    Contraception and Hormone Management  - Current use of birth control for period management, not contraception, as partner is infertile.  - Consider switching from birth control to hormone therapy type of estrogen when appropriate. Discussed potential use of IUD for managing bleeding.     With her history of VTE on the estrogen containing vaginal ring, concern for provoked VTE and if she should be on estrogen at all.  Recommend discussion with hematology. Starting progesterone would likely be safe in this patient, could consider nightly micronized progesterone for night sweats and LNG-IUD for contraception, versus non hormonal options for contraception and night sweats.     India Grant MD, MPH  Obstetrics and Gynecology

## 2025-06-10 PROBLEM — Z86.718 HISTORY OF VENOUS THROMBOEMBOLISM: Status: ACTIVE | Noted: 2025-06-10

## 2025-06-30 ENCOUNTER — ANCILLARY PROCEDURE (OUTPATIENT)
Dept: MAMMOGRAPHY | Facility: CLINIC | Age: 40
End: 2025-06-30
Payer: COMMERCIAL

## 2025-06-30 DIAGNOSIS — F32.A DEPRESSION, UNSPECIFIED DEPRESSION TYPE: ICD-10-CM

## 2025-06-30 DIAGNOSIS — Z12.31 VISIT FOR SCREENING MAMMOGRAM: ICD-10-CM

## 2025-06-30 PROCEDURE — 77063 BREAST TOMOSYNTHESIS BI: CPT | Mod: TC | Performed by: RADIOLOGY

## 2025-06-30 PROCEDURE — 77067 SCR MAMMO BI INCL CAD: CPT | Mod: TC | Performed by: RADIOLOGY

## 2025-07-01 RX ORDER — ESCITALOPRAM OXALATE 20 MG/1
TABLET ORAL
Qty: 90 TABLET | Refills: 0 | Status: SHIPPED | OUTPATIENT
Start: 2025-07-01

## 2025-07-01 RX ORDER — ESCITALOPRAM OXALATE 20 MG/1
TABLET ORAL
Qty: 90 TABLET | Refills: 3 | OUTPATIENT
Start: 2025-07-01

## 2025-07-08 DIAGNOSIS — F32.A DEPRESSION, UNSPECIFIED DEPRESSION TYPE: ICD-10-CM

## 2025-07-08 RX ORDER — ESCITALOPRAM OXALATE 20 MG/1
TABLET ORAL
Qty: 90 TABLET | Refills: 0 | OUTPATIENT
Start: 2025-07-08

## 2025-07-11 PROBLEM — E66.813 CLASS 3 OBESITY (H): Status: ACTIVE | Noted: 2025-07-11

## 2025-07-17 NOTE — TELEPHONE ENCOUNTER
Patient Quality Outreach    Patient is due for the following:   Cervical Cancer Screening - PAP Needed  There are no preventive care reminders to display for this patient.    Action(s) Taken:   No follow up needed at this time. Had PAP smear 07/11/2025    Type of outreach:    Chart review performed, no outreach needed.    Questions for provider review:    None         Brigitte Cooper, [facility]  Chart routed to None.

## 2025-07-29 ENCOUNTER — ANCILLARY PROCEDURE (OUTPATIENT)
Dept: GENERAL RADIOLOGY | Facility: CLINIC | Age: 40
End: 2025-07-29
Attending: FAMILY MEDICINE
Payer: COMMERCIAL

## 2025-07-29 ENCOUNTER — OFFICE VISIT (OUTPATIENT)
Dept: URGENT CARE | Facility: URGENT CARE | Age: 40
End: 2025-07-29
Payer: COMMERCIAL

## 2025-07-29 VITALS
RESPIRATION RATE: 16 BRPM | TEMPERATURE: 97.1 F | HEIGHT: 66 IN | DIASTOLIC BLOOD PRESSURE: 78 MMHG | WEIGHT: 272.4 LBS | SYSTOLIC BLOOD PRESSURE: 124 MMHG | OXYGEN SATURATION: 98 % | HEART RATE: 82 BPM | BODY MASS INDEX: 43.78 KG/M2

## 2025-07-29 DIAGNOSIS — M25.571 PAIN IN JOINT INVOLVING ANKLE AND FOOT, RIGHT: Primary | ICD-10-CM

## 2025-07-29 DIAGNOSIS — M25.571 PAIN IN JOINT INVOLVING ANKLE AND FOOT, RIGHT: ICD-10-CM

## 2025-07-29 PROCEDURE — 3074F SYST BP LT 130 MM HG: CPT | Performed by: FAMILY MEDICINE

## 2025-07-29 PROCEDURE — 99213 OFFICE O/P EST LOW 20 MIN: CPT | Performed by: FAMILY MEDICINE

## 2025-07-29 PROCEDURE — 73610 X-RAY EXAM OF ANKLE: CPT | Mod: TC | Performed by: RADIOLOGY

## 2025-07-29 PROCEDURE — 3078F DIAST BP <80 MM HG: CPT | Performed by: FAMILY MEDICINE

## 2025-07-29 NOTE — PROGRESS NOTES
Urgent Care Clinic Visit    Chief Complaint   Patient presents with    Musculoskeletal Problem     Patient presents with right ankle pain that started today.  She stood up from sitting on floor and felt a stabbing pain.  It is a constant ache but hurts more with certain movements.  There is also some swelling.                 7/29/2025     6:47 PM   Additional Questions   Roomed by Tasneem

## 2025-07-30 NOTE — PATIENT INSTRUCTIONS
I do not see any fractures on tonight's x-rays.  If the radiologist sees something that I did not, we will contact you to let you know and adjust our plan.    For now, please use tight-fitting socks or an ace bandage to compress the swollen ankle.  Keep it elevated tonight as much as you're able.  You can use Tylenol or ibuprofen as needed to help with the soreness.    If it's getting noticeably worse instead of better over the next couple of days, please return for recheck.

## 2025-07-30 NOTE — PROGRESS NOTES
"  ICD-10-CM    1. Pain in joint involving ankle and foot, right  M25.571 XR Ankle Right G/E 3 Views        Likely mild sprain of lateral ankle.  No fractures on my reading of tonight's 3 views of the right ankle.  Radiology review pending.    PLAN:  Patient Instructions   I do not see any fractures on tonight's x-rays.  If the radiologist sees something that I did not, we will contact you to let you know and adjust our plan.    For now, please use tight-fitting socks or an ace bandage to compress the swollen ankle.  Keep it elevated tonight as much as you're able.  You can use Tylenol or ibuprofen as needed to help with the soreness.    If it's getting noticeably worse instead of better over the next couple of days, please return for recheck.    SUBJECTIVE:  Maine Choe is a 40 year old female who presents to  today with sudden onset of R lateral ankle pain after standing up from sitting on the ground today.  Pain has been intense enough that it's hard to put weight on the R foot.  No numbness that she's noted.  Doesn't remember twisting the ankle.  Pain is stabbing and throbbing.  Has also noticed swelling over the outside of the ankle.  Noticed that it was painful to flex her foot while driving here today.    OBJECTIVE:  /78   Pulse 82   Temp 97.1  F (36.2  C) (Tympanic)   Resp 16   Ht 1.676 m (5' 6\")   Wt 123.6 kg (272 lb 6.4 oz)   LMP 06/30/2025 (Approximate)   SpO2 98%   BMI 43.97 kg/m    GEN: well-appearing, in NAD  R ankle with mild swelling over the lateral malleolus, tenderness over the distal 4 cm of the fibula posteriorly, no ecchymosis, no tenderness at the base of the 5th or anywhere else in the foot or ankle.  Negative squeeze test.  Negative anterior drawer of the ankle.      "

## 2025-08-07 DIAGNOSIS — E66.813 CLASS 3 SEVERE OBESITY DUE TO EXCESS CALORIES WITH BODY MASS INDEX (BMI) OF 40.0 TO 44.9 IN ADULT, UNSPECIFIED WHETHER SERIOUS COMORBIDITY PRESENT (H): ICD-10-CM

## 2025-08-07 RX ORDER — TOPIRAMATE 50 MG/1
50 TABLET, FILM COATED ORAL 2 TIMES DAILY
Qty: 180 TABLET | Refills: 0 | Status: SHIPPED | OUTPATIENT
Start: 2025-08-07

## (undated) DEVICE — SU VICRYL 4-0 PS-2 18" UND J496H

## (undated) DEVICE — ESU ELEC BLADE 2.75" COATED/INSULATED E1455

## (undated) DEVICE — Device

## (undated) DEVICE — SU WND CLOSURE VLOC 180 ABS 3-0 6" V-20 VLOCL0604

## (undated) DEVICE — SU VICRYL+ 0 27 UR6 VLT VCP603H

## (undated) DEVICE — DAVINCI XI DRAPE ARM 470015

## (undated) DEVICE — SOL WATER IRRIG 1000ML BOTTLE 2F7114

## (undated) DEVICE — GLOVE BIOGEL PI MICRO SZ 7.5 48575

## (undated) DEVICE — DAVINCI XI OBTURATOR BLADELESS 8MM 470359

## (undated) DEVICE — LINEN ORTHO ACL PACK 5447

## (undated) DEVICE — LUBRICANT INST ELECTROLUBE EL101

## (undated) DEVICE — SOL NACL 0.9% IRRIG 1000ML BOTTLE 07138-09

## (undated) DEVICE — ESU PENCIL W/HOLSTER E2350H

## (undated) DEVICE — GLOVE BIOGEL PI MICRO INDICATOR UNDERGLOVE SZ 8.0 48980

## (undated) DEVICE — DAVINCI XI SEAL UNIVERSAL 5-12MM 470500

## (undated) DEVICE — CLIP ENDO HEMO-LOC GREEN MED/LG 544230

## (undated) DEVICE — ESU GROUND PAD ADULT W/CORD E7507

## (undated) DEVICE — LINEN FULL SHEET 5511

## (undated) DEVICE — ENDO TROCAR FIRST ENTRY KII FIOS Z-THRD 05X100MM CTF03

## (undated) DEVICE — BLADE KNIFE SURG 11 371111

## (undated) DEVICE — ENDO POUCH UNIVERSAL RETRIEVAL SYSTEM INZII 5MM CD003

## (undated) DEVICE — CLIP ENDO HEMO-LOC PURPLE LG 544240

## (undated) DEVICE — SPONGE RAY-TEC 3X3" 30-094

## (undated) RX ORDER — ACETAMINOPHEN 325 MG/1
TABLET ORAL
Status: DISPENSED
Start: 2025-01-15

## (undated) RX ORDER — OXYCODONE HYDROCHLORIDE 5 MG/1
TABLET ORAL
Status: DISPENSED
Start: 2025-01-15

## (undated) RX ORDER — LIDOCAINE HYDROCHLORIDE 10 MG/ML
INJECTION, SOLUTION EPIDURAL; INFILTRATION; INTRACAUDAL; PERINEURAL
Status: DISPENSED
Start: 2025-01-15

## (undated) RX ORDER — FENTANYL CITRATE 50 UG/ML
INJECTION, SOLUTION INTRAMUSCULAR; INTRAVENOUS
Status: DISPENSED
Start: 2025-01-15

## (undated) RX ORDER — INDOCYANINE GREEN AND WATER 25 MG
KIT INJECTION
Status: DISPENSED
Start: 2025-01-15

## (undated) RX ORDER — BUPIVACAINE HYDROCHLORIDE AND EPINEPHRINE 5; 5 MG/ML; UG/ML
INJECTION, SOLUTION EPIDURAL; INTRACAUDAL; PERINEURAL
Status: DISPENSED
Start: 2025-01-15

## (undated) RX ORDER — DEXAMETHASONE SODIUM PHOSPHATE 4 MG/ML
INJECTION, SOLUTION INTRA-ARTICULAR; INTRALESIONAL; INTRAMUSCULAR; INTRAVENOUS; SOFT TISSUE
Status: DISPENSED
Start: 2025-01-15

## (undated) RX ORDER — GLYCOPYRROLATE 0.2 MG/ML
INJECTION, SOLUTION INTRAMUSCULAR; INTRAVENOUS
Status: DISPENSED
Start: 2025-01-15

## (undated) RX ORDER — PROPOFOL 10 MG/ML
INJECTION, EMULSION INTRAVENOUS
Status: DISPENSED
Start: 2025-01-15

## (undated) RX ORDER — CEFAZOLIN SODIUM/WATER 3 G/30 ML
SYRINGE (ML) INTRAVENOUS
Status: DISPENSED
Start: 2025-01-15

## (undated) RX ORDER — KETOROLAC TROMETHAMINE 30 MG/ML
INJECTION, SOLUTION INTRAMUSCULAR; INTRAVENOUS
Status: DISPENSED
Start: 2025-01-15